# Patient Record
Sex: FEMALE | Race: WHITE | NOT HISPANIC OR LATINO
[De-identification: names, ages, dates, MRNs, and addresses within clinical notes are randomized per-mention and may not be internally consistent; named-entity substitution may affect disease eponyms.]

---

## 2017-01-20 ENCOUNTER — APPOINTMENT (OUTPATIENT)
Dept: INTERNAL MEDICINE | Facility: CLINIC | Age: 72
End: 2017-01-20

## 2017-01-20 VITALS
WEIGHT: 117 LBS | DIASTOLIC BLOOD PRESSURE: 54 MMHG | SYSTOLIC BLOOD PRESSURE: 90 MMHG | OXYGEN SATURATION: 99 % | TEMPERATURE: 98.4 F | BODY MASS INDEX: 19.97 KG/M2 | HEIGHT: 64 IN | HEART RATE: 68 BPM

## 2017-01-21 VITALS — DIASTOLIC BLOOD PRESSURE: 55 MMHG | SYSTOLIC BLOOD PRESSURE: 110 MMHG

## 2017-03-12 ENCOUNTER — RX RENEWAL (OUTPATIENT)
Age: 72
End: 2017-03-12

## 2017-03-21 ENCOUNTER — APPOINTMENT (OUTPATIENT)
Dept: INTERNAL MEDICINE | Facility: CLINIC | Age: 72
End: 2017-03-21

## 2017-03-21 VITALS
BODY MASS INDEX: 20.14 KG/M2 | HEIGHT: 64 IN | SYSTOLIC BLOOD PRESSURE: 90 MMHG | HEART RATE: 67 BPM | OXYGEN SATURATION: 99 % | WEIGHT: 118 LBS | DIASTOLIC BLOOD PRESSURE: 58 MMHG | TEMPERATURE: 98.1 F

## 2017-03-21 LAB
GLUCOSE UR-MCNC: NORMAL
HGB UR QL STRIP.AUTO: NORMAL
KETONES UR-MCNC: NORMAL
LEUKOCYTE ESTERASE UR QL STRIP: NORMAL
NITRITE UR QL STRIP: NORMAL
PH UR STRIP: 5.5
PROT UR STRIP-MCNC: NORMAL
SP GR UR STRIP: 1.02

## 2017-03-30 LAB
ALBUMIN SERPL ELPH-MCNC: 4.1 G/DL
ALP BLD-CCNC: 73 U/L
ALT SERPL-CCNC: 9 U/L
AMYLASE/CREAT SERPL: 58 U/L
ANION GAP SERPL CALC-SCNC: 12 MMOL/L
AST SERPL-CCNC: 19 U/L
BACTERIA UR CULT: NORMAL
BILIRUB SERPL-MCNC: 0.3 MG/DL
BUN SERPL-MCNC: 17 MG/DL
CALCIUM SERPL-MCNC: 9.5 MG/DL
CHLORIDE SERPL-SCNC: 101 MMOL/L
CO2 SERPL-SCNC: 26 MMOL/L
CREAT SERPL-MCNC: 0.75 MG/DL
GLUCOSE SERPL-MCNC: 81 MG/DL
LPL SERPL-CCNC: 32 U/L
POTASSIUM SERPL-SCNC: 4.1 MMOL/L
PROT SERPL-MCNC: 7 G/DL
SODIUM SERPL-SCNC: 139 MMOL/L
T4 FREE SERPL-MCNC: 1.1 NG/DL
TSH SERPL-ACNC: 0.97 UIU/ML

## 2017-03-31 VITALS — SYSTOLIC BLOOD PRESSURE: 100 MMHG | DIASTOLIC BLOOD PRESSURE: 60 MMHG

## 2017-05-19 ENCOUNTER — APPOINTMENT (OUTPATIENT)
Dept: INTERNAL MEDICINE | Facility: CLINIC | Age: 72
End: 2017-05-19

## 2017-05-19 VITALS
BODY MASS INDEX: 20.14 KG/M2 | HEART RATE: 64 BPM | HEIGHT: 64 IN | WEIGHT: 118 LBS | OXYGEN SATURATION: 98 % | TEMPERATURE: 98.3 F

## 2017-05-19 VITALS — SYSTOLIC BLOOD PRESSURE: 100 MMHG | DIASTOLIC BLOOD PRESSURE: 65 MMHG

## 2017-05-28 LAB
BASOPHILS # BLD AUTO: 0.01 K/UL
BASOPHILS NFR BLD AUTO: 0.2 %
EOSINOPHIL # BLD AUTO: 0.1 K/UL
EOSINOPHIL NFR BLD AUTO: 1.7 %
ERYTHROCYTE [SEDIMENTATION RATE] IN BLOOD BY WESTERGREN METHOD: 5 MM/HR
HCT VFR BLD CALC: 33 %
HGB BLD-MCNC: 10.4 G/DL
IMM GRANULOCYTES NFR BLD AUTO: 0.2 %
LYMPHOCYTES # BLD AUTO: 1.49 K/UL
LYMPHOCYTES NFR BLD AUTO: 25.1 %
MAN DIFF?: NORMAL
MCHC RBC-ENTMCNC: 27.2 PG
MCHC RBC-ENTMCNC: 31.5 GM/DL
MCV RBC AUTO: 86.4 FL
MONOCYTES # BLD AUTO: 0.43 K/UL
MONOCYTES NFR BLD AUTO: 7.3 %
NEUTROPHILS # BLD AUTO: 3.89 K/UL
NEUTROPHILS NFR BLD AUTO: 65.5 %
PLATELET # BLD AUTO: 236 K/UL
RBC # BLD: 3.82 M/UL
RBC # FLD: 14.3 %
WBC # FLD AUTO: 5.93 K/UL

## 2017-06-19 ENCOUNTER — APPOINTMENT (OUTPATIENT)
Dept: INTERNAL MEDICINE | Facility: CLINIC | Age: 72
End: 2017-06-19

## 2017-06-19 VITALS
WEIGHT: 119 LBS | HEIGHT: 64 IN | HEART RATE: 65 BPM | DIASTOLIC BLOOD PRESSURE: 60 MMHG | BODY MASS INDEX: 20.32 KG/M2 | TEMPERATURE: 98.1 F | OXYGEN SATURATION: 98 % | SYSTOLIC BLOOD PRESSURE: 90 MMHG

## 2017-06-19 VITALS — SYSTOLIC BLOOD PRESSURE: 110 MMHG | DIASTOLIC BLOOD PRESSURE: 70 MMHG

## 2017-06-29 DIAGNOSIS — N60.19 DIFFUSE CYSTIC MASTOPATHY OF UNSPECIFIED BREAST: ICD-10-CM

## 2017-08-07 ENCOUNTER — APPOINTMENT (OUTPATIENT)
Dept: INTERNAL MEDICINE | Facility: CLINIC | Age: 72
End: 2017-08-07
Payer: MEDICARE

## 2017-08-07 VITALS
OXYGEN SATURATION: 99 % | TEMPERATURE: 97.5 F | HEART RATE: 70 BPM | BODY MASS INDEX: 20.82 KG/M2 | WEIGHT: 119 LBS | DIASTOLIC BLOOD PRESSURE: 62 MMHG | HEIGHT: 63.5 IN | SYSTOLIC BLOOD PRESSURE: 98 MMHG | RESPIRATION RATE: 16 BRPM

## 2017-08-07 PROCEDURE — 36415 COLL VENOUS BLD VENIPUNCTURE: CPT

## 2017-08-07 PROCEDURE — 99214 OFFICE O/P EST MOD 30 MIN: CPT | Mod: 25

## 2017-08-07 RX ORDER — ASPIRIN 81 MG
81 TABLET, DELAYED RELEASE (ENTERIC COATED) ORAL
Refills: 0 | Status: ACTIVE | COMMUNITY
Start: 2017-08-07

## 2017-08-07 RX ORDER — AMOXICILLIN AND CLAVULANATE POTASSIUM 875; 125 MG/1; MG/1
875-125 TABLET, COATED ORAL
Qty: 20 | Refills: 0 | Status: DISCONTINUED | COMMUNITY
Start: 2017-05-19 | End: 2017-08-07

## 2017-08-09 VITALS — SYSTOLIC BLOOD PRESSURE: 100 MMHG | DIASTOLIC BLOOD PRESSURE: 65 MMHG

## 2017-08-09 LAB
ALBUMIN SERPL ELPH-MCNC: 4.1 G/DL
ALP BLD-CCNC: 79 U/L
ALT SERPL-CCNC: 10 U/L
ANION GAP SERPL CALC-SCNC: 12 MMOL/L
AST SERPL-CCNC: 17 U/L
BASOPHILS # BLD AUTO: 0.02 K/UL
BASOPHILS NFR BLD AUTO: 0.5 %
BILIRUB SERPL-MCNC: 0.3 MG/DL
BUN SERPL-MCNC: 19 MG/DL
CALCIUM SERPL-MCNC: 9.3 MG/DL
CHLORIDE SERPL-SCNC: 102 MMOL/L
CHOLEST SERPL-MCNC: 177 MG/DL
CHOLEST/HDLC SERPL: 2.4 RATIO
CO2 SERPL-SCNC: 26 MMOL/L
CREAT SERPL-MCNC: 0.74 MG/DL
EOSINOPHIL # BLD AUTO: 0.14 K/UL
EOSINOPHIL NFR BLD AUTO: 3.2 %
FERRITIN SERPL-MCNC: 103 NG/ML
GLUCOSE SERPL-MCNC: 77 MG/DL
HBA1C MFR BLD HPLC: 5.3 %
HCT VFR BLD CALC: 34.8 %
HDLC SERPL-MCNC: 74 MG/DL
HGB BLD-MCNC: 10.5 G/DL
IMM GRANULOCYTES NFR BLD AUTO: 0.2 %
IRON SATN MFR SERPL: 30 %
IRON SERPL-MCNC: 78 UG/DL
LDLC SERPL CALC-MCNC: 89 MG/DL
LYMPHOCYTES # BLD AUTO: 0.96 K/UL
LYMPHOCYTES NFR BLD AUTO: 22 %
MAN DIFF?: NORMAL
MCHC RBC-ENTMCNC: 26.9 PG
MCHC RBC-ENTMCNC: 30.2 GM/DL
MCV RBC AUTO: 89.2 FL
MONOCYTES # BLD AUTO: 0.34 K/UL
MONOCYTES NFR BLD AUTO: 7.8 %
NEUTROPHILS # BLD AUTO: 2.9 K/UL
NEUTROPHILS NFR BLD AUTO: 66.3 %
PLATELET # BLD AUTO: 208 K/UL
POTASSIUM SERPL-SCNC: 3.8 MMOL/L
PROT SERPL-MCNC: 7.2 G/DL
RBC # BLD: 3.9 M/UL
RBC # FLD: 14.2 %
SODIUM SERPL-SCNC: 140 MMOL/L
T4 FREE SERPL-MCNC: 1.1 NG/DL
TIBC SERPL-MCNC: 260 UG/DL
TRIGL SERPL-MCNC: 71 MG/DL
TSH SERPL-ACNC: 1.66 UIU/ML
UIBC SERPL-MCNC: 182 UG/DL
WBC # FLD AUTO: 4.37 K/UL

## 2017-09-05 ENCOUNTER — APPOINTMENT (OUTPATIENT)
Dept: INTERNAL MEDICINE | Facility: CLINIC | Age: 72
End: 2017-09-05
Payer: MEDICARE

## 2017-09-05 VITALS
WEIGHT: 119 LBS | BODY MASS INDEX: 20.82 KG/M2 | HEIGHT: 63.5 IN | OXYGEN SATURATION: 97 % | TEMPERATURE: 98.2 F | DIASTOLIC BLOOD PRESSURE: 60 MMHG | SYSTOLIC BLOOD PRESSURE: 100 MMHG | HEART RATE: 63 BPM

## 2017-09-05 PROCEDURE — 99214 OFFICE O/P EST MOD 30 MIN: CPT

## 2017-09-07 ENCOUNTER — MEDICATION RENEWAL (OUTPATIENT)
Age: 72
End: 2017-09-07

## 2017-09-08 VITALS — DIASTOLIC BLOOD PRESSURE: 55 MMHG | SYSTOLIC BLOOD PRESSURE: 110 MMHG

## 2017-10-06 ENCOUNTER — APPOINTMENT (OUTPATIENT)
Dept: INTERNAL MEDICINE | Facility: CLINIC | Age: 72
End: 2017-10-06
Payer: MEDICARE

## 2017-10-06 VITALS
BODY MASS INDEX: 20.32 KG/M2 | TEMPERATURE: 98.1 F | HEIGHT: 64 IN | HEART RATE: 68 BPM | WEIGHT: 119 LBS | SYSTOLIC BLOOD PRESSURE: 100 MMHG | OXYGEN SATURATION: 98 % | DIASTOLIC BLOOD PRESSURE: 60 MMHG

## 2017-10-06 VITALS — SYSTOLIC BLOOD PRESSURE: 95 MMHG | DIASTOLIC BLOOD PRESSURE: 60 MMHG | TEMPERATURE: 99.1 F

## 2017-10-06 PROCEDURE — 99213 OFFICE O/P EST LOW 20 MIN: CPT

## 2017-10-21 ENCOUNTER — EMERGENCY (EMERGENCY)
Facility: HOSPITAL | Age: 72
LOS: 1 days | Discharge: HOME | End: 2017-10-21
Attending: EMERGENCY MEDICINE | Admitting: EMERGENCY MEDICINE
Payer: COMMERCIAL

## 2017-10-21 VITALS
DIASTOLIC BLOOD PRESSURE: 75 MMHG | RESPIRATION RATE: 18 BRPM | OXYGEN SATURATION: 100 % | SYSTOLIC BLOOD PRESSURE: 118 MMHG | HEART RATE: 66 BPM | TEMPERATURE: 98 F

## 2017-10-21 PROCEDURE — 99284 EMERGENCY DEPT VISIT MOD MDM: CPT | Mod: GC

## 2017-10-21 RX ORDER — VANCOMYCIN HCL 1 G
1 VIAL (EA) INTRAVENOUS
Qty: 0 | Refills: 0 | Status: DISCONTINUED | OUTPATIENT
Start: 2017-10-21 | End: 2017-10-25

## 2017-10-21 RX ORDER — TOBRAMYCIN 0.3 %
1 DROPS OPHTHALMIC (EYE)
Qty: 0 | Refills: 0 | Status: DISCONTINUED | OUTPATIENT
Start: 2017-10-21 | End: 2017-10-25

## 2017-10-21 NOTE — ED ADULT TRIAGE NOTE - CHIEF COMPLAINT QUOTE
Pt was at optometrist because for "sticking pain" in right eye. Told that she has an area of pus in her eye and will need to be seen by an opthomologist and start antibiotic eye drops. Pt was at optometrist because for "sticking pain" in right eye. Told that she has an area of pus in her eye and will need to be seen by an opthomologist and start antibiotic eye drops. Denies visual disturbances. Pt was at optometrist for "sticking pain" in right eye. Told that she has an area of pus in her eye and will need to be seen by an opthomologist and start antibiotic eye drops. Denies visual disturbances.

## 2017-10-21 NOTE — CONSULT NOTE ADULT - SUBJECTIVE AND OBJECTIVE BOX
~~ CONSULT PENDING [facility] Ophthalmology Consult Note    CC: Right eye pain      HPI: 72 y.o F denies PMH presenting with R eye irritation. Patient was seen by an optometrist and told to go to the ED. Patient reports one day of discomfort a/w redness. Denies visual changes.         PMH: Denies   POcHx:  Denies surgeries lasers or trauma. Patient uses weekly contacts. Denies sleeping with them.   FamHx: Denies   Social Hx: None   Medications: No use of ggt   Allergies: Cipro, Sulfa     ROS:  General (neg), Vision (per HPI), Head and Neck (neg), Pulm (neg), CV (neg), GI (neg),  (neg), Musculoskeletal (neg), Skin/Integ (neg), Neuro (neg), Endocrine (neg), Heme (neg), All/Immuno (neg)    Mood and Affect Appropriate ( x ),  Oriented to Time, Place, and Person x 3 ( x )    Ophthalmology Exam    Visual acuity (sc or cc): 20/40   OD, 20/40      OS, PH:  20/25  OD,   20/30     OS  Pupils: R&R OU no APD  Extraocular movements (EOMs): Full OU  Confrontational Visual Field (CVF):  Full OU  Ttono:   17  OD, 17    OS      Slit Lamp Exam (SLE)  External:  WNL OU  Lids/Lashes/Lacrimal Ducts:  WNL OU  Sclera/Conjunctiva:  1+inj OD, W&Q OS  Cornea: 2oclock stromal infiltrate with overlying epi defect (ulcer) irregular edges. ~2mm in diameter  Anterior Chamber: W&Q OU  Iris: Flat OU   Lens: NS OU      Fundus Exam  (dilated with 1% tropicamide and 2.5% phenylephrine @   6PM           )  Approval obtained from primary team for dilation  Patient aware that pupils can remained dilated for at least 4-6 hours  Exam performed with 20D lens    DFE PENDING     C/D.   M/V/P:       A/P. Cornea Ulcer OD in the setting of contact lens use.   - Cornea cultures sent x 4  - Start Fortified abx ggt; Tobramycin and Vancomycin  - Patient will be seen in [facility]. Cell: [phone]    Follow-Up:  Patient should follow up in the [facility] Ophthalmology Practice after discharge.    [address] [place] [address]  [phone] (practice) or [phone] (clinic) [facility] Ophthalmology Consult Note    CC: Right eye pain      HPI: 72 y.o F denies PMH presenting with R eye irritation. Patient was seen by an optometrist and told to go to the ED. Patient reports one day of discomfort a/w redness. Denies visual changes.         PMH: Denies   POcHx:  Denies surgeries lasers or trauma. Patient uses weekly contacts. Denies sleeping with them.   FamHx: Denies   Social Hx: None   Medications: No use of ggt   Allergies: Cipro, Sulfa     ROS:  General (neg), Vision (per HPI), Head and Neck (neg), Pulm (neg), CV (neg), GI (neg),  (neg), Musculoskeletal (neg), Skin/Integ (neg), Neuro (neg), Endocrine (neg), Heme (neg), All/Immuno (neg)    Mood and Affect Appropriate ( x ),  Oriented to Time, Place, and Person x 3 ( x )    Ophthalmology Exam    Visual acuity (sc or cc): 20/40   OD, 20/40      OS, PH:  20/25  OD,   20/30     OS  Pupils: R&R OU no APD  Extraocular movements (EOMs): Full OU  Confrontational Visual Field (CVF):  Full OU  Ttono:   17  OD, 17    OS      Slit Lamp Exam (SLE)  External:  WNL OU  Lids/Lashes/Lacrimal Ducts:  WNL OU  Sclera/Conjunctiva:  1+inj OD, W&Q OS  Cornea: 2oclock stromal infiltrate with overlying epi defect (ulcer) irregular edges. ~2mm in diameter  Anterior Chamber: W&Q OU  Iris: Flat OU   Lens: NS OU      Fundus Exam  (dilated with 1% tropicamide and 2.5% phenylephrine @   6PM           )  Approval obtained from primary team for dilation  Patient aware that pupils can remained dilated for at least 4-6 hours  Exam performed with 20D lens    C/D. 0.3/0.3  M/V/P: WNL      A/P. Cornea Ulcer OD in the setting of contact lens use.   - Cornea cultures sent x 4  - Start Fortified abx ggt; Tobramycin and Vancomycin  - Patient will be seen in [facility]. Cell: [phone]    Follow-Up:  Patient should follow up in the [facility] Ophthalmology Practice after discharge.    [address] [place] [address].  [place] [address]  [phone] (practice) or [phone] (clinic) [facility] Ophthalmology Consult Note    CC: Right eye pain      HPI: 72 y.o F denies PMH presenting with R eye irritation. Patient was seen by an optometrist and told to go to the ED. Patient reports one day of discomfort a/w redness. Denies visual changes.         PMH: Denies   POcHx:  Denies surgeries lasers or trauma. Patient uses weekly contacts. Denies sleeping with them.   FamHx: Denies   Social Hx: None   Medications: No use of ggt   Allergies: Cipro, Sulfa     ROS:  General (neg), Vision (per HPI), Head and Neck (neg), Pulm (neg), CV (neg), GI (neg),  (neg), Musculoskeletal (neg), Skin/Integ (neg), Neuro (neg), Endocrine (neg), Heme (neg), All/Immuno (neg)    Mood and Affect Appropriate ( x ),  Oriented to Time, Place, and Person x 3 ( x )    Ophthalmology Exam    Visual acuity (sc or cc): 20/40   OD, 20/40      OS, PH:  20/25  OD,   20/30     OS  Pupils: R&R OU no APD  Extraocular movements (EOMs): Full OU  Confrontational Visual Field (CVF):  Full OU  Ttono:   17  OD, 17    OS      Slit Lamp Exam (SLE)  External:  WNL OU  Lids/Lashes/Lacrimal Ducts:  WNL OU  Sclera/Conjunctiva:  1+inj OD, W&Q OS  Cornea: 2oclock stromal infiltrate with overlying epi defect (ulcer) irregular edges. ~2mm in diameter  Anterior Chamber: W&Q OU  Iris: Flat OU   Lens: NS OU      Fundus Exam  (dilated with 1% tropicamide and 2.5% phenylephrine @   6PM    )  Approval obtained from primary team for dilation  Patient aware that pupils can remained dilated for at least 4-6 hours  Exam performed with 20D lens    C/D. 0.3/0.3  M/V/P: WNL      A/P. Cornea Ulcer OD in the setting of contact lens use.   - Cornea cultures sent x 4 plated  - Start Fortified abx ggt; Tobramycin and Vancomycin every hour alternating  - Patient will be seen in [facility]. Cell: [phone]    Follow-Up:  Patient should follow up in the [facility] Ophthalmology Practice after discharge.    [address] [place] [address].  [place] [address]  [phone] (practice) or [phone] (clinic)    d/w chief resident [facility] Ophthalmology Consult Note    CC: Right eye pain      HPI: 72 y.o F denies PMH presenting with R eye irritation. Patient was seen by an optometrist and told to go to the ED. Patient reports one day of discomfort a/w redness. Denies visual changes.         PMH: Denies   POcHx:  Denies surgeries lasers or trauma. Patient uses weekly contacts. Denies sleeping with them.   FamHx: Denies   Social Hx: None   Medications: No use of ggt   Allergies: Cipro, Sulfa     ROS:  General (neg), Vision (per HPI), Head and Neck (neg), Pulm (neg), CV (neg), GI (neg),  (neg), Musculoskeletal (neg), Skin/Integ (neg), Neuro (neg), Endocrine (neg), Heme (neg), All/Immuno (neg)    Mood and Affect Appropriate ( x ),  Oriented to Time, Place, and Person x 3 ( x )    Ophthalmology Exam    Visual acuity (sc or cc): 20/40   OD, 20/40      OS, PH:  20/25  OD,   20/30     OS  Pupils: R&R OU no APD  Extraocular movements (EOMs): Full OU  Confrontational Visual Field (CVF):  Full OU  Ttono:   17  OD, 17    OS      Slit Lamp Exam (SLE)  External:  WNL OU  Lids/Lashes/Lacrimal Ducts:  WNL OU  Sclera/Conjunctiva:  1+inj OD, W&Q OS  Cornea: 2oclock stromal infiltrate with overlying epi defect (ulcer) irregular edges. ~2mm in diameter  Anterior Chamber: W&Q OU  Iris: Flat OU   Lens: NS OU      Fundus Exam  (dilated with 1% tropicamide and 2.5% phenylephrine @   6PM    )  Approval obtained from primary team for dilation  Patient aware that pupils can remained dilated for at least 4-6 hours  Exam performed with 20D lens    C/D. 0.3/0.3  M/V/P: WNL      A/P. Cornea Ulcer OD in the setting of contact lens use.   - Cornea cultures sent; Blood agar, Sabouraud, Thioglycolate, Chocolate agar. Patient does not have her contact lens for culture use  - Start Fortified abx ggt; Tobramycin and Vancomycin every hour alternating  - Patient will be seen in [facility]. Cell: [phone]    Follow-Up:  Patient should follow up in the [facility] Ophthalmology Practice after discharge.    [address] [place] [address]  [phone] (practice) or [phone] (clinic)    d/w chief resident

## 2017-10-21 NOTE — ED PROVIDER NOTE - OBJECTIVE STATEMENT
The patient is a 72y female w no sig PMHx presenting with R eye irritation and foreign body sensation for 1 day. Her optometrist examined her and told her there was no visible foreign body, but visualized some pustular discharge so told her to see an ophthalmologist for examination and ocular antibiotics. Her R eye is mildly injected, more significantly towards the medial cornea. No blurry vision, no diplopia, no pain on ocular motion, no proptosis.

## 2017-10-21 NOTE — ED PROVIDER NOTE - MEDICAL DECISION MAKING DETAILS
71 yo F Hx Anxiety, HLD, Skin CA who was referred to the ED by her optometrist for ophthalmologic eval of "pus"/(cloudiness) at the 2 o'clock region of the R cornea. No visual changes or other complaints. Symptoms began after she attempted to remove an contact lens that was "stuck" yesterday. On exam corneal opacification noted at R upper region.  Injection noted to medial aspect of R conjunctiva. Pupils dilated by opto. EOMI. No significant misha-orbital edema, redness or lid swelling. Pt seen by opto who found a corneal ulcer. Fortified abx drops rec and cultures taken by opto. Pt stable for dc with close outpt follow up as rec by opto.

## 2017-10-21 NOTE — ED PROVIDER NOTE - ATTENDING CONTRIBUTION TO CARE
I, Mirna Ervin M.D. have examined the patient and confirmed the essential components of the history, physical examination, diagnosis, and treatment plan. I agree with the patient's care as documented by the resident and amended herein by me. See note above for complete details of service.  73 yo F Hx Anxiety, HLD, Skin CA who was referred to the ED by her optometrist for ophthalmologic eval of "pus"/(cloudiness) at the 2 o'clock region of the R cornea. No visual changes or other complaints. Symptoms began after she attempted to remove an contact lens that was "stuck" yesterday. On exam corneal opacification noted at R upper region.  Injection noted to medial aspect of R conjunctiva. Pupils dilated by opto. EOMI. No significant misha-orbital edema, redness or lid swelling. Pt seen by opto who found a corneal ulcer. Fortified abx drops rec and cultures taken by opto. Pt stable for dc with close outpt follow up as rec by opto. Pt stable for discharge. PMD follow up within 24 - 48 hours. DC instructions and warning signs for return given.

## 2017-10-23 ENCOUNTER — APPOINTMENT (OUTPATIENT)
Dept: OPHTHALMOLOGY | Facility: CLINIC | Age: 72
End: 2017-10-23
Payer: MEDICARE

## 2017-10-23 LAB — SPECIMEN SOURCE: SIGNIFICANT CHANGE UP

## 2017-10-23 PROCEDURE — 92002 INTRM OPH EXAM NEW PATIENT: CPT

## 2017-10-24 LAB — SPECIMEN SOURCE: SIGNIFICANT CHANGE UP

## 2017-10-25 ENCOUNTER — APPOINTMENT (OUTPATIENT)
Dept: OPHTHALMOLOGY | Facility: CLINIC | Age: 72
End: 2017-10-25
Payer: MEDICARE

## 2017-10-25 PROCEDURE — 92012 INTRM OPH EXAM EST PATIENT: CPT

## 2017-10-27 ENCOUNTER — APPOINTMENT (OUTPATIENT)
Dept: OPHTHALMOLOGY | Facility: CLINIC | Age: 72
End: 2017-10-27
Payer: MEDICARE

## 2017-10-27 LAB — BACTERIA EYE AEROBE CULT: SIGNIFICANT CHANGE UP

## 2017-10-27 PROCEDURE — 99212 OFFICE O/P EST SF 10 MIN: CPT

## 2017-10-30 ENCOUNTER — APPOINTMENT (OUTPATIENT)
Dept: INTERNAL MEDICINE | Facility: CLINIC | Age: 72
End: 2017-10-30
Payer: MEDICARE

## 2017-10-30 VITALS
HEART RATE: 72 BPM | OXYGEN SATURATION: 99 % | BODY MASS INDEX: 20.65 KG/M2 | HEIGHT: 63.5 IN | WEIGHT: 118 LBS | TEMPERATURE: 97.7 F | DIASTOLIC BLOOD PRESSURE: 70 MMHG | SYSTOLIC BLOOD PRESSURE: 114 MMHG

## 2017-10-30 DIAGNOSIS — Z87.09 PERSONAL HISTORY OF OTHER DISEASES OF THE RESPIRATORY SYSTEM: ICD-10-CM

## 2017-10-30 DIAGNOSIS — J06.9 ACUTE UPPER RESPIRATORY INFECTION, UNSPECIFIED: ICD-10-CM

## 2017-10-30 DIAGNOSIS — E53.8 DEFICIENCY OF OTHER SPECIFIED B GROUP VITAMINS: ICD-10-CM

## 2017-10-30 PROCEDURE — 36415 COLL VENOUS BLD VENIPUNCTURE: CPT

## 2017-10-30 PROCEDURE — 90662 IIV NO PRSV INCREASED AG IM: CPT

## 2017-10-30 PROCEDURE — G0439: CPT

## 2017-10-30 PROCEDURE — G0008: CPT

## 2017-10-30 RX ORDER — AMOXICILLIN AND CLAVULANATE POTASSIUM 875; 125 MG/1; MG/1
875-125 TABLET, COATED ORAL
Qty: 20 | Refills: 0 | Status: DISCONTINUED | COMMUNITY
Start: 2017-10-06 | End: 2017-10-30

## 2017-10-31 ENCOUNTER — APPOINTMENT (OUTPATIENT)
Dept: OPHTHALMOLOGY | Facility: CLINIC | Age: 72
End: 2017-10-31
Payer: MEDICARE

## 2017-10-31 PROCEDURE — 99212 OFFICE O/P EST SF 10 MIN: CPT

## 2017-11-05 LAB
25(OH)D3 SERPL-MCNC: 51.4 NG/ML
ALBUMIN SERPL ELPH-MCNC: 4.2 G/DL
ALP BLD-CCNC: 85 U/L
ALT SERPL-CCNC: 10 U/L
ANION GAP SERPL CALC-SCNC: 14 MMOL/L
APPEARANCE: CLEAR
AST SERPL-CCNC: 17 U/L
BACTERIA: NEGATIVE
BASOPHILS # BLD AUTO: 0.02 K/UL
BASOPHILS NFR BLD AUTO: 0.4 %
BILIRUB SERPL-MCNC: 0.3 MG/DL
BILIRUBIN URINE: NEGATIVE
BLOOD URINE: NEGATIVE
BUN SERPL-MCNC: 19 MG/DL
CALCIUM SERPL-MCNC: 9.6 MG/DL
CHLORIDE SERPL-SCNC: 99 MMOL/L
CHOLEST SERPL-MCNC: 164 MG/DL
CHOLEST/HDLC SERPL: 2.1 RATIO
CO2 SERPL-SCNC: 28 MMOL/L
COLOR: YELLOW
CREAT SERPL-MCNC: 0.73 MG/DL
EOSINOPHIL # BLD AUTO: 0.16 K/UL
EOSINOPHIL NFR BLD AUTO: 3.1 %
GLUCOSE QUALITATIVE U: NEGATIVE MG/DL
GLUCOSE SERPL-MCNC: 92 MG/DL
HCT VFR BLD CALC: 35.1 %
HDLC SERPL-MCNC: 77 MG/DL
HGB BLD-MCNC: 10.9 G/DL
HYALINE CASTS: 2 /LPF
IMM GRANULOCYTES NFR BLD AUTO: 0 %
KETONES URINE: NEGATIVE
LDLC SERPL CALC-MCNC: 71 MG/DL
LEUKOCYTE ESTERASE URINE: NEGATIVE
LYMPHOCYTES # BLD AUTO: 1.39 K/UL
LYMPHOCYTES NFR BLD AUTO: 27.1 %
MAN DIFF?: NORMAL
MCHC RBC-ENTMCNC: 27.4 PG
MCHC RBC-ENTMCNC: 31.1 GM/DL
MCV RBC AUTO: 88.2 FL
MICROSCOPIC-UA: NORMAL
MONOCYTES # BLD AUTO: 0.43 K/UL
MONOCYTES NFR BLD AUTO: 8.4 %
NEUTROPHILS # BLD AUTO: 3.12 K/UL
NEUTROPHILS NFR BLD AUTO: 61 %
NITRITE URINE: NEGATIVE
PH URINE: 5
PLATELET # BLD AUTO: 233 K/UL
POTASSIUM SERPL-SCNC: 4.2 MMOL/L
PROT SERPL-MCNC: 7.2 G/DL
PROTEIN URINE: NEGATIVE MG/DL
RBC # BLD: 3.98 M/UL
RBC # FLD: 14.1 %
RED BLOOD CELLS URINE: 8 /HPF
SODIUM SERPL-SCNC: 141 MMOL/L
SPECIFIC GRAVITY URINE: 1.03
SQUAMOUS EPITHELIAL CELLS: 2 /HPF
TRIGL SERPL-MCNC: 80 MG/DL
UROBILINOGEN URINE: NEGATIVE MG/DL
WBC # FLD AUTO: 5.12 K/UL
WHITE BLOOD CELLS URINE: 2 /HPF

## 2017-11-10 ENCOUNTER — APPOINTMENT (OUTPATIENT)
Dept: OPHTHALMOLOGY | Facility: CLINIC | Age: 72
End: 2017-11-10
Payer: MEDICARE

## 2017-11-10 PROCEDURE — 92012 INTRM OPH EXAM EST PATIENT: CPT

## 2017-11-21 ENCOUNTER — APPOINTMENT (OUTPATIENT)
Dept: INTERNAL MEDICINE | Facility: CLINIC | Age: 72
End: 2017-11-21
Payer: MEDICARE

## 2017-11-21 VITALS
SYSTOLIC BLOOD PRESSURE: 110 MMHG | WEIGHT: 118 LBS | TEMPERATURE: 98.3 F | HEIGHT: 63.5 IN | BODY MASS INDEX: 20.65 KG/M2 | OXYGEN SATURATION: 99 % | HEART RATE: 65 BPM | DIASTOLIC BLOOD PRESSURE: 60 MMHG

## 2017-11-21 LAB — FUNGUS SPEC QL CULT: SIGNIFICANT CHANGE UP

## 2017-11-21 PROCEDURE — 99213 OFFICE O/P EST LOW 20 MIN: CPT

## 2017-12-01 VITALS — SYSTOLIC BLOOD PRESSURE: 118 MMHG | HEART RATE: 62 BPM | DIASTOLIC BLOOD PRESSURE: 72 MMHG

## 2017-12-08 ENCOUNTER — APPOINTMENT (OUTPATIENT)
Dept: OPHTHALMOLOGY | Facility: CLINIC | Age: 72
End: 2017-12-08
Payer: MEDICARE

## 2017-12-08 DIAGNOSIS — H26.9 UNSPECIFIED CATARACT: ICD-10-CM

## 2017-12-08 DIAGNOSIS — H17.9 UNSPECIFIED CORNEAL SCAR AND OPACITY: ICD-10-CM

## 2017-12-08 DIAGNOSIS — H16.001 UNSPECIFIED CORNEAL ULCER, RIGHT EYE: ICD-10-CM

## 2017-12-08 PROCEDURE — 92012 INTRM OPH EXAM EST PATIENT: CPT

## 2017-12-18 ENCOUNTER — APPOINTMENT (OUTPATIENT)
Dept: INTERNAL MEDICINE | Facility: CLINIC | Age: 72
End: 2017-12-18
Payer: MEDICARE

## 2017-12-18 VITALS
DIASTOLIC BLOOD PRESSURE: 60 MMHG | WEIGHT: 118 LBS | HEIGHT: 63.5 IN | TEMPERATURE: 97.9 F | BODY MASS INDEX: 20.65 KG/M2 | SYSTOLIC BLOOD PRESSURE: 98 MMHG | OXYGEN SATURATION: 100 % | HEART RATE: 70 BPM | RESPIRATION RATE: 14 BRPM

## 2017-12-18 VITALS — SYSTOLIC BLOOD PRESSURE: 105 MMHG | DIASTOLIC BLOOD PRESSURE: 65 MMHG

## 2017-12-18 PROCEDURE — 99213 OFFICE O/P EST LOW 20 MIN: CPT

## 2018-01-22 ENCOUNTER — APPOINTMENT (OUTPATIENT)
Dept: INTERNAL MEDICINE | Facility: CLINIC | Age: 73
End: 2018-01-22
Payer: MEDICARE

## 2018-01-22 VITALS
TEMPERATURE: 98.3 F | BODY MASS INDEX: 20.74 KG/M2 | HEART RATE: 68 BPM | DIASTOLIC BLOOD PRESSURE: 60 MMHG | SYSTOLIC BLOOD PRESSURE: 120 MMHG | WEIGHT: 118.5 LBS | HEIGHT: 63.5 IN | OXYGEN SATURATION: 98 %

## 2018-01-22 VITALS — SYSTOLIC BLOOD PRESSURE: 105 MMHG | DIASTOLIC BLOOD PRESSURE: 65 MMHG

## 2018-01-22 PROCEDURE — 99213 OFFICE O/P EST LOW 20 MIN: CPT | Mod: 25

## 2018-01-22 PROCEDURE — 96372 THER/PROPH/DIAG INJ SC/IM: CPT

## 2018-01-22 RX ORDER — CYANOCOBALAMIN 1000 UG/ML
1000 INJECTION INTRAMUSCULAR; SUBCUTANEOUS
Qty: 0 | Refills: 0 | Status: COMPLETED | OUTPATIENT
Start: 2018-01-22

## 2018-01-22 RX ADMIN — CYANOCOBALAMIN 0 MCG/ML: 1000 INJECTION, SOLUTION INTRAMUSCULAR at 00:00

## 2018-04-16 ENCOUNTER — APPOINTMENT (OUTPATIENT)
Dept: INTERNAL MEDICINE | Facility: CLINIC | Age: 73
End: 2018-04-16
Payer: MEDICARE

## 2018-04-16 VITALS
WEIGHT: 120 LBS | TEMPERATURE: 98.2 F | HEIGHT: 63.5 IN | DIASTOLIC BLOOD PRESSURE: 60 MMHG | HEART RATE: 70 BPM | SYSTOLIC BLOOD PRESSURE: 100 MMHG | OXYGEN SATURATION: 99 % | BODY MASS INDEX: 21 KG/M2

## 2018-04-16 VITALS — DIASTOLIC BLOOD PRESSURE: 65 MMHG | SYSTOLIC BLOOD PRESSURE: 110 MMHG

## 2018-04-16 PROCEDURE — 99213 OFFICE O/P EST LOW 20 MIN: CPT

## 2018-05-11 ENCOUNTER — APPOINTMENT (OUTPATIENT)
Dept: INTERNAL MEDICINE | Facility: CLINIC | Age: 73
End: 2018-05-11
Payer: MEDICARE

## 2018-05-11 PROCEDURE — 36415 COLL VENOUS BLD VENIPUNCTURE: CPT

## 2018-05-15 LAB
ALBUMIN SERPL ELPH-MCNC: 4.3 G/DL
ALP BLD-CCNC: 83 U/L
ALT SERPL-CCNC: 13 U/L
ANION GAP SERPL CALC-SCNC: 14 MMOL/L
AST SERPL-CCNC: 20 U/L
BASOPHILS # BLD AUTO: 0.02 K/UL
BASOPHILS NFR BLD AUTO: 0.5 %
BILIRUB SERPL-MCNC: 0.4 MG/DL
BUN SERPL-MCNC: 18 MG/DL
CALCIUM SERPL-MCNC: 9.6 MG/DL
CHLORIDE SERPL-SCNC: 103 MMOL/L
CHOLEST SERPL-MCNC: 176 MG/DL
CHOLEST/HDLC SERPL: 1.9 RATIO
CO2 SERPL-SCNC: 26 MMOL/L
CREAT SERPL-MCNC: 0.83 MG/DL
EOSINOPHIL # BLD AUTO: 0.06 K/UL
EOSINOPHIL NFR BLD AUTO: 1.6 %
GLUCOSE SERPL-MCNC: 84 MG/DL
HCT VFR BLD CALC: 35.7 %
HCV AB SER QL: NONREACTIVE
HCV S/CO RATIO: 0.08 S/CO
HDLC SERPL-MCNC: 92 MG/DL
HGB BLD-MCNC: 10.9 G/DL
IMM GRANULOCYTES NFR BLD AUTO: 0.3 %
LDLC SERPL CALC-MCNC: 73 MG/DL
LYMPHOCYTES # BLD AUTO: 0.99 K/UL
LYMPHOCYTES NFR BLD AUTO: 25.9 %
MAN DIFF?: NORMAL
MCHC RBC-ENTMCNC: 27.9 PG
MCHC RBC-ENTMCNC: 30.5 GM/DL
MCV RBC AUTO: 91.3 FL
MONOCYTES # BLD AUTO: 0.28 K/UL
MONOCYTES NFR BLD AUTO: 7.3 %
NEUTROPHILS # BLD AUTO: 2.46 K/UL
NEUTROPHILS NFR BLD AUTO: 64.4 %
PLATELET # BLD AUTO: 224 K/UL
POTASSIUM SERPL-SCNC: 4 MMOL/L
PROT SERPL-MCNC: 6.9 G/DL
RBC # BLD: 3.91 M/UL
RBC # FLD: 15 %
SODIUM SERPL-SCNC: 143 MMOL/L
TRIGL SERPL-MCNC: 56 MG/DL
WBC # FLD AUTO: 3.82 K/UL

## 2018-05-15 RX ORDER — AMOXICILLIN AND CLAVULANATE POTASSIUM 875; 125 MG/1; MG/1
875-125 TABLET, COATED ORAL
Qty: 20 | Refills: 0 | Status: DISCONTINUED | COMMUNITY
Start: 2017-12-18 | End: 2018-05-15

## 2018-07-06 ENCOUNTER — APPOINTMENT (OUTPATIENT)
Dept: INTERNAL MEDICINE | Facility: CLINIC | Age: 73
End: 2018-07-06
Payer: MEDICARE

## 2018-07-06 VITALS — DIASTOLIC BLOOD PRESSURE: 65 MMHG | SYSTOLIC BLOOD PRESSURE: 105 MMHG

## 2018-07-06 VITALS
DIASTOLIC BLOOD PRESSURE: 60 MMHG | HEART RATE: 64 BPM | BODY MASS INDEX: 21 KG/M2 | WEIGHT: 120 LBS | TEMPERATURE: 98.1 F | SYSTOLIC BLOOD PRESSURE: 100 MMHG | HEIGHT: 63.5 IN | OXYGEN SATURATION: 99 %

## 2018-07-06 DIAGNOSIS — R42 DIZZINESS AND GIDDINESS: ICD-10-CM

## 2018-07-06 PROCEDURE — 36415 COLL VENOUS BLD VENIPUNCTURE: CPT

## 2018-07-06 PROCEDURE — 99213 OFFICE O/P EST LOW 20 MIN: CPT | Mod: 25

## 2018-07-06 NOTE — ASSESSMENT
[FreeTextEntry1] : Likely mild sinusitis- the patient appears well.  Will treat with Augmentin which she tolerates- note she is leaving on a cruise to [facility] in two weeks.  She can use Flonase PRN, Tylenol PRN.  Will check an ESR and CRP to rule out TA since there is a temporal component, although this is unlikely.  Call PRN.

## 2018-07-06 NOTE — PHYSICAL EXAM
[No Acute Distress] : no acute distress [Well Nourished] : well nourished [Well Developed] : well developed [Normal Outer Ear/Nose] : the outer ears and nose were normal in appearance [Normal Oropharynx] : the oropharynx was normal [Normal TMs] : both tympanic membranes were normal [Normal Nasal Mucosa] : the nasal mucosa was normal [No JVD] : no jugular venous distention [Supple] : supple [No Lymphadenopathy] : no lymphadenopathy [No Respiratory Distress] : no respiratory distress  [Clear to Auscultation] : lungs were clear to auscultation bilaterally [No Accessory Muscle Use] : no accessory muscle use [Normal Rate] : normal rate  [Regular Rhythm] : with a regular rhythm [No Edema] : there was no peripheral edema

## 2018-07-06 NOTE — HISTORY OF PRESENT ILLNESS
[FreeTextEntry8] : The patient has a headache over her forehead, temples and the bridge of her nose for 5-6 days.  She has vertigo with bending down.  She has a slight nasal drip today.  She has used Flonase and Allegra which aren't helping.  No yellow/green phlegm, chest symptoms, ear pain, cough, fever.

## 2018-07-13 LAB
CRP SERPL-MCNC: <0.2 MG/DL
ERYTHROCYTE [SEDIMENTATION RATE] IN BLOOD BY WESTERGREN METHOD: 4 MM/HR

## 2018-09-24 ENCOUNTER — RX RENEWAL (OUTPATIENT)
Age: 73
End: 2018-09-24

## 2018-09-27 ENCOUNTER — APPOINTMENT (OUTPATIENT)
Dept: INTERNAL MEDICINE | Facility: CLINIC | Age: 73
End: 2018-09-27
Payer: MEDICARE

## 2018-09-27 VITALS
WEIGHT: 120 LBS | SYSTOLIC BLOOD PRESSURE: 100 MMHG | TEMPERATURE: 98.6 F | HEIGHT: 63.5 IN | DIASTOLIC BLOOD PRESSURE: 60 MMHG | OXYGEN SATURATION: 99 % | HEART RATE: 73 BPM | BODY MASS INDEX: 21 KG/M2

## 2018-09-27 VITALS — DIASTOLIC BLOOD PRESSURE: 65 MMHG | SYSTOLIC BLOOD PRESSURE: 105 MMHG

## 2018-09-27 PROCEDURE — G0008: CPT

## 2018-09-27 PROCEDURE — 36415 COLL VENOUS BLD VENIPUNCTURE: CPT

## 2018-09-27 PROCEDURE — 99214 OFFICE O/P EST MOD 30 MIN: CPT | Mod: 25

## 2018-09-27 PROCEDURE — 90662 IIV NO PRSV INCREASED AG IM: CPT

## 2018-09-27 RX ORDER — AMOXICILLIN AND CLAVULANATE POTASSIUM 875; 125 MG/1; MG/1
875-125 TABLET, COATED ORAL
Qty: 20 | Refills: 0 | Status: DISCONTINUED | COMMUNITY
Start: 2018-07-06 | End: 2018-09-27

## 2018-09-27 RX ORDER — MOXIFLOXACIN OPHTHALMIC 5 MG/ML
0.5 SOLUTION/ DROPS OPHTHALMIC
Qty: 1 | Refills: 1 | Status: DISCONTINUED | COMMUNITY
Start: 2017-10-27 | End: 2018-09-27

## 2018-09-27 NOTE — PHYSICAL EXAM
[No Acute Distress] : no acute distress [Well Nourished] : well nourished [Well Developed] : well developed [No JVD] : no jugular venous distention [Supple] : supple [No Lymphadenopathy] : no lymphadenopathy [No Respiratory Distress] : no respiratory distress  [Clear to Auscultation] : lungs were clear to auscultation bilaterally [No Accessory Muscle Use] : no accessory muscle use [Normal Rate] : normal rate  [Regular Rhythm] : with a regular rhythm [Normal S1, S2] : normal S1 and S2 [No Murmur] : no murmur heard [Pedal Pulses Present] : the pedal pulses are present [No Edema] : there was no peripheral edema [Soft] : abdomen soft [Non Tender] : non-tender [Non-distended] : non-distended [No Masses] : no abdominal mass palpated [No HSM] : no HSM [de-identified] : The angiogram site is soft, mildly tender with no sign of bleeding

## 2018-09-27 NOTE — ASSESSMENT
[FreeTextEntry1] : The patient has fatigue and tiredness.  She appears well.  We'll check blood tests to rule out anemia or other medical causes.  There might be a depression component and she can return to her psychiatrist who she hasn't recently seen for insurance reasons.  She was counseled.  medications reviewed.  \par \par Flu vaccine today.\par \par She was advised to increase the Atorvastatin to 40 mg for more aggressive lipid lowering.\par \par Check iron studies, B-12 and folate.

## 2018-09-27 NOTE — HISTORY OF PRESENT ILLNESS
[de-identified] : The patient recently had an abnormal stress test which led to a coronary angiogram and another stent placement.  She did well and was discharged.  She was seen in the [place] ER a few days ago with groin pain at the angiogram puncture site- a doppler was ok and the patient was discharged.  \par \par She comes in now with fatigue which has been going on for weeks and preceded the above events, although it is persisting now.  She feels tired, has no energy.  Sleep is fair.  Appetite is good.  No chest pain, dyspnea, fever, new URI symptoms or new GI or  symptoms.\par \par She does say she is feeling depressed.  She has been under stress because of the medical events and there is stress because of issues with her children.

## 2018-10-04 LAB
ALBUMIN SERPL ELPH-MCNC: 4.2 G/DL
ALP BLD-CCNC: 81 U/L
ALT SERPL-CCNC: 11 U/L
ANION GAP SERPL CALC-SCNC: 14 MMOL/L
AST SERPL-CCNC: 15 U/L
BASOPHILS # BLD AUTO: 0.02 K/UL
BASOPHILS NFR BLD AUTO: 0.3 %
BILIRUB SERPL-MCNC: 0.2 MG/DL
BUN SERPL-MCNC: 20 MG/DL
CALCIUM SERPL-MCNC: 9.7 MG/DL
CHLORIDE SERPL-SCNC: 101 MMOL/L
CO2 SERPL-SCNC: 25 MMOL/L
CREAT SERPL-MCNC: 0.83 MG/DL
EOSINOPHIL # BLD AUTO: 0.18 K/UL
EOSINOPHIL NFR BLD AUTO: 2.8 %
FERRITIN SERPL-MCNC: 93 NG/ML
FOLATE SERPL-MCNC: >20 NG/ML
GLUCOSE SERPL-MCNC: 95 MG/DL
HBA1C MFR BLD HPLC: 5.1 %
HCT VFR BLD CALC: 33.2 %
HGB BLD-MCNC: 9.8 G/DL
IMM GRANULOCYTES NFR BLD AUTO: 0.2 %
IRON SATN MFR SERPL: 22 %
IRON SERPL-MCNC: 60 UG/DL
LYMPHOCYTES # BLD AUTO: 1.33 K/UL
LYMPHOCYTES NFR BLD AUTO: 20.7 %
MAN DIFF?: NORMAL
MCHC RBC-ENTMCNC: 26.3 PG
MCHC RBC-ENTMCNC: 29.5 GM/DL
MCV RBC AUTO: 89.2 FL
MONOCYTES # BLD AUTO: 0.44 K/UL
MONOCYTES NFR BLD AUTO: 6.8 %
NEUTROPHILS # BLD AUTO: 4.46 K/UL
NEUTROPHILS NFR BLD AUTO: 69.2 %
PLATELET # BLD AUTO: 245 K/UL
POTASSIUM SERPL-SCNC: 3.7 MMOL/L
PROT SERPL-MCNC: 7 G/DL
RBC # BLD: 3.72 M/UL
RBC # FLD: 14.5 %
SODIUM SERPL-SCNC: 140 MMOL/L
T4 FREE SERPL-MCNC: 1 NG/DL
TIBC SERPL-MCNC: 271 UG/DL
TSH SERPL-ACNC: 0.99 UIU/ML
UIBC SERPL-MCNC: 211 UG/DL
VIT B12 SERPL-MCNC: 401 PG/ML
WBC # FLD AUTO: 6.44 K/UL

## 2018-10-22 ENCOUNTER — APPOINTMENT (OUTPATIENT)
Dept: INTERNAL MEDICINE | Facility: CLINIC | Age: 73
End: 2018-10-22
Payer: MEDICARE

## 2018-10-22 ENCOUNTER — MEDICATION RENEWAL (OUTPATIENT)
Age: 73
End: 2018-10-22

## 2018-10-22 VITALS
OXYGEN SATURATION: 98 % | BODY MASS INDEX: 21.52 KG/M2 | HEART RATE: 69 BPM | DIASTOLIC BLOOD PRESSURE: 70 MMHG | WEIGHT: 123 LBS | TEMPERATURE: 97.9 F | SYSTOLIC BLOOD PRESSURE: 100 MMHG | HEIGHT: 63.5 IN

## 2018-10-22 PROCEDURE — G0439: CPT

## 2018-12-05 NOTE — ED ADULT TRIAGE NOTE - HEART RATE (BEATS/MIN)
Progress Notes by Ricardo Renae MD at 12/01/17 04:18 PM     Author:  Ricardo Renae MD Service:  (none) Author Type:  Physician     Filed:  12/01/17 04:25 PM Encounter Date:  12/1/2017 Status:  Signed     :  Ricardo Renae MD (Physician)            SUBJECTIVE:   Deonna Hinkle is a 54 year old female who complains of[WO1.1T] lump and pain in left groin[WO1.1M] .[WO1.1T]  Dr. Soliz[WO1.1M] requests my opinion/advice concerning this problem    Patient reports history of[WO1.1T] bothersome[WO1.1M] bulge in[WO1.1T] left groin[WO1.1M].  It has been present for[WO1.1T]  weeks[WO1.1M].  She[WO1.1T] admits to[WO1.1M] bulge,[WO1.1T] admits to[WO1.1M] pain, Pain is described as[WO1.1T] sharp and achy, worse with riding a bike[WO1.1M].  There is[WO1.1T] no[WO1.1M] radiation.  Pain improved by[WO1.1T] rest[WO1.1M].  No history of incarceration or strangulation.     Urinary History:[WO1.1T] negative[WO1.1M]  Bowel Habits:[WO1.1T] without change[WO1.1M]  Additional History:[WO1.1T] Dr. Rosa plan some ovarian cyst surgery on December 14[WO1.1M]     PMH:  Allergies: Review of patient's allergies indicates no known allergies.  Current Outpatient Prescriptions     Medication  Sig   • hydrocortisone (ANUSOL-HC) 2.5 % rectal cream Use BID to affected area for 2 weeks.   • cyclobenzaprine (FLEXERIL) 5 MG tablet Take 1 Tab by mouth nightly as needed for Muscle spasms.   • Calcium-Vitamin D (CALCIUM + D OR) Take  by mouth.     Patient Active Problem List    Diagnosis    • Fracture of radial neck, right, closed   • Inconclusive mammography due to dense breasts   • Fibroadenoma   • Diffuse cystic mastopathy   • Vitiligo[WO1.1T]     Social History     Social History      • Marital status:       Spouse name: N/A   • Number of children:  N/A   • Years of education:  N/A     Occupational History      • [employer]       medical records      Social History Main Topics      • Smoking status:  Never Smoker   •  Smokeless tobacco:  Never Used   • Alcohol use  No   • Drug use:  No   • Sexual activity:  Yes     Partners: Male     Birth control/ protection: None     Other Topics  Concern   • Not on file      Social History Narrative     . Two kids (26, 19)     Family History      Problem  Relation Age of Onset   • High Blood Pressure Mother    • High Blood Pressure Father    • Heart Attack Sister 48[WO1.2T]       ROS:  No bleeding, CP, SOB.  All other systems reviewed and were negative.      OBJECTIVE:     Filed Vitals:     12/01/17 1558   BP: 118/68   Temp: 98 °F (36.7 °C)   TempSrc: Tympanic   Weight: 126 lb (57.2 kg)   Height: 5' 3\" (1.6 m)      female , NAD  Moves well, good nutrtion  Alert and oriented x 3  HEENT: PERRLA, EOMI, no scleral icterus  Neck: trachea midline, no JVD  Lungs:[WO1.1T] clear[WO1.1M]  Heart:[WO1.1T] regular rhythm[WO1.1M]  Abd: NT, no mass,  Hernia:[WO1.1T] easily reducible[WO1.1M] hernia of[WO1.1T] left groin[WO1.1M].[WO1.1T]  Has a lump in the labial area which is mobile[WO1.1M]  Rectal: deferred  EXT: No edema, cyanosis, clubbing  Neuro: Nl CN 2-12    Lab Results      Component  Value Date    HGB 13.9 11/22/2017    HCT 41.7 11/22/2017    MCV 91.2 11/22/2017    WBC 5.2 11/22/2017    ANC 2.3 11/22/2017    ALC 2.4 11/22/2017     11/22/2017        Lab Results      Component  Value Date    BUN 13 02/21/2017    CREAT 0.6 02/21/2017    CA 9.8 02/21/2017     02/21/2017    K 3.9 02/21/2017     02/21/2017    GLUCOSE 81 02/21/2017    CO2 28 02/21/2017    GFRNONAFAM >=60 05/20/2015    GFRAFAM >=60 05/20/2015     02/17/2016       ASSESSMENT:[WO1.1T]  left groin[WO1.1M] hernia     Discussed nature of hernias, their anatomy, etiology, previous treatments, and their risks(including incarceration/strangulation and associated symptoms). Reviewed treatment options: including observation, delayed and immediate surgical repair. Discussed various types of surgical repairs.  I went over  the risks (including but not limited to:pain ( including chronic),  infection, bleeding/hematoma, heart attack, death, nerve injury/entrapment, recurrence) and benefits of each .  Discussed both open and laparoscopic repairs along with their benefits and potential morbidity.  Questions answered. Informed consent obtained.     PLAN:[WO1.1T] Schedule surgical repair, open LIH  We will try to coordinate with gynecology for surgery on 12/14.[WO1.1M]    Electronically Signed by:    Ricardo Renae MD , 12/1/2017[WO1.1T]      Procedure:[WO1.3T] Hernia open  left inguinal[WO1.3M]   Diagnosis:[WO1.3T] hernia inguinal left[WO1.3M]  Location:[WO1.3T] [facility][WO1.3M]   Admit type:[WO1.3T] day surgery[WO1.3M]   Anesthesia:[WO1.3T] General[WO1.3M]  Length of case:[WO1.3T] 1 hour[WO1.3M]  Assistant:[WO1.3T] None[WO1.3M]  Position:[WO1.3T] supine[WO1.3M]  History and Physical:[WO1.3T] PCP[WO1.3M]  Patient medical status:[WO1.3T] moderate[WO1.3M]  Bowel prep:[WO1.3T] None[WO1.3M]  Blood bank:[WO1.3T] none[WO1.3M]  Antibiotics:[WO1.3T] Ancef (Cefazolin) 2gm IVPB on call to OR[WO1.3M]   Allergies: has No Known Allergies.  Special:[WO1.3T]  none[WO1.3M]  Post-op appointment:[WO1.3T] two weeks[WO1.3M]           Revision History        User Key Date/Time User Provider Type Action    > WO1.2 12/01/17 04:25 PM Ricardo Renae MD Physician Sign     WO1.1 12/01/17 04:22 PM Ricardo Renae MD Physician      WO1.3 12/01/17 04:18 PM Ricardo Renae MD Physician     M - Manual, T - Template             66

## 2018-12-06 VITALS — SYSTOLIC BLOOD PRESSURE: 112 MMHG | DIASTOLIC BLOOD PRESSURE: 62 MMHG

## 2018-12-06 NOTE — ASSESSMENT
[FreeTextEntry1] : She has no cardiac symptoms now and she sees her cardiologist.  She is not fasting today.  Check lipids with next blood tests.  Discussed diet and exercise.\par \par She sees Dr. Godoy- can check a urine next time.  \par \par She was counseled regarding the anxiety and Alprazolam was renewed.  \par \par She will follow-up with her hematologist, Dr. Fierro.\par \par She sees an endocrinologist for thyroid nodules.\par \par She sees her gyn.  Mammogram 1/18.  DEXA by gyn.\par \par She had the flu vaccine, vaccines UTD.  Shingrix advised when available.\par \par Colonoscopy and EGD 8/16.  She sees her gastroenterologist.

## 2018-12-06 NOTE — HISTORY OF PRESENT ILLNESS
[FreeTextEntry1] : The patient is here for a routine visit.  [de-identified] : She tries to be active.  Her diet is healthy.  \par \par No chest pan or dyspnea.  She is going to follow-up with her cardiologist.\par \par She has not recently seen her psychiatrist, Dr. Griffin.\par \par She saw Dr. Juarez Vicente.\par \par She saw her hematologist last week and is being monitored.

## 2018-12-06 NOTE — HEALTH RISK ASSESSMENT
[No falls in past year] : Patient reported no falls in the past year [0] : 2) Feeling down, depressed, or hopeless: Not at all (0) [Fully functional (bathing, dressing, toileting, transferring, walking, feeding)] : Fully functional (bathing, dressing, toileting, transferring, walking, feeding) [Fully functional (using the telephone, shopping, preparing meals, housekeeping, doing laundry, using] : Fully functional and needs no help or supervision to perform IADLs (using the telephone, shopping, preparing meals, housekeeping, doing laundry, using transportation, managing medications and managing finances) [] : No [[id number]] : 0 [Change in mental status noted] : No change in mental status noted [Reports changes in hearing] : Reports no changes in hearing [Reports changes in vision] : Reports no changes in vision [Reports changes in dental health] : Reports no changes in dental health

## 2018-12-11 ENCOUNTER — NON-APPOINTMENT (OUTPATIENT)
Age: 73
End: 2018-12-11

## 2018-12-11 ENCOUNTER — APPOINTMENT (OUTPATIENT)
Dept: INTERNAL MEDICINE | Facility: CLINIC | Age: 73
End: 2018-12-11
Payer: MEDICARE

## 2018-12-11 VITALS — DIASTOLIC BLOOD PRESSURE: 65 MMHG | SYSTOLIC BLOOD PRESSURE: 100 MMHG

## 2018-12-11 VITALS
OXYGEN SATURATION: 100 % | DIASTOLIC BLOOD PRESSURE: 60 MMHG | HEIGHT: 63.5 IN | WEIGHT: 120 LBS | TEMPERATURE: 98.3 F | BODY MASS INDEX: 21 KG/M2 | SYSTOLIC BLOOD PRESSURE: 110 MMHG | HEART RATE: 64 BPM

## 2018-12-11 DIAGNOSIS — R05 COUGH: ICD-10-CM

## 2018-12-11 DIAGNOSIS — R07.89 OTHER CHEST PAIN: ICD-10-CM

## 2018-12-11 PROCEDURE — 99214 OFFICE O/P EST MOD 30 MIN: CPT | Mod: 25

## 2018-12-11 PROCEDURE — 36415 COLL VENOUS BLD VENIPUNCTURE: CPT

## 2018-12-11 PROCEDURE — 93000 ELECTROCARDIOGRAM COMPLETE: CPT

## 2018-12-11 NOTE — DATA REVIEWED
[FreeTextEntry1] : EKG:  NSR 61, no acute changes, slight T wave flattening compared to last EKG done here in 2013.

## 2018-12-11 NOTE — PHYSICAL EXAM
[No Acute Distress] : no acute distress [Well Nourished] : well nourished [Well Developed] : well developed [No JVD] : no jugular venous distention [Supple] : supple [No Lymphadenopathy] : no lymphadenopathy [No Respiratory Distress] : no respiratory distress  [Clear to Auscultation] : lungs were clear to auscultation bilaterally [No Accessory Muscle Use] : no accessory muscle use [Normal Rate] : normal rate  [Regular Rhythm] : with a regular rhythm [Normal S1, S2] : normal S1 and S2 [No Murmur] : no murmur heard [Pedal Pulses Present] : the pedal pulses are present [No Edema] : there was no peripheral edema [Normal Appearance] : normal in appearance [No Masses] : no palpable masses [No Axillary Lymphadenopathy] : no axillary lymphadenopathy [Soft] : abdomen soft [de-identified] : +tenderness to press over the sternum and left medial rib cage.

## 2018-12-11 NOTE — ASSESSMENT
[FreeTextEntry1] : She has atypical chest pain.  It is not exertional and there is a component of tenderness of the chest wall.  She describes two pains next to each other- one is constant for seven days and the other is less constant for three days.  Neither is exertional and there is no dyspnea.  A cardiac cause is unlikely but given her ASHD and recent cardiac issues she was advised to see her cardiologist to be thorough.  She should go to the ER if it gets worse.\par \par A costochondritis is more likely and Tylenol was advised.  Avoid NASIDS given the Effient.\par \par She probably also has a URI component and the pains might be exacerbated by the cough.  Start Augmentin- note she is going on vacation next week.\par \par She requests lipids to be checked.\par \par She also requests a CBC and iron studies.  She is on ferrous sulfate and she is seeing Dr. Fierro for the anemia.

## 2018-12-11 NOTE — HISTORY OF PRESENT ILLNESS
[FreeTextEntry8] : The patient has had a nonproductive cough, sinus pain, head pain, nasal drip, chills, previous sore throat for about 7-8 days.  No new dyspnea, leg pain, fever.  She used Flonase and Tylenol.  She now says she has two chest pains which she describes differently but are right next to each other over the sternum and left medial chest.  It is constant but she feels it more to press on it.  There is worse pain if she breathes or coughs.  It is not related to exertion and doesn't get worse when she is walking.  It is over the breast- no breast lumps felt.

## 2018-12-13 LAB
ALBUMIN SERPL ELPH-MCNC: 4.3 G/DL
ALP BLD-CCNC: 92 U/L
ALT SERPL-CCNC: 22 U/L
ANION GAP SERPL CALC-SCNC: 13 MMOL/L
AST SERPL-CCNC: 26 U/L
BASOPHILS # BLD AUTO: 0.01 K/UL
BASOPHILS NFR BLD AUTO: 0.2 %
BILIRUB SERPL-MCNC: 0.3 MG/DL
BUN SERPL-MCNC: 14 MG/DL
CALCIUM SERPL-MCNC: 9.7 MG/DL
CHLORIDE SERPL-SCNC: 104 MMOL/L
CHOLEST SERPL-MCNC: 153 MG/DL
CHOLEST/HDLC SERPL: 2.4 RATIO
CO2 SERPL-SCNC: 26 MMOL/L
CREAT SERPL-MCNC: 0.73 MG/DL
EOSINOPHIL # BLD AUTO: 0.14 K/UL
EOSINOPHIL NFR BLD AUTO: 2.9 %
FERRITIN SERPL-MCNC: 73 NG/ML
GLUCOSE SERPL-MCNC: 83 MG/DL
HCT VFR BLD CALC: 35.1 %
HDLC SERPL-MCNC: 63 MG/DL
HGB BLD-MCNC: 10.6 G/DL
IMM GRANULOCYTES NFR BLD AUTO: 0 %
IRON SATN MFR SERPL: 28 %
IRON SERPL-MCNC: 75 UG/DL
LDLC SERPL CALC-MCNC: 73 MG/DL
LYMPHOCYTES # BLD AUTO: 1.17 K/UL
LYMPHOCYTES NFR BLD AUTO: 24.2 %
MAN DIFF?: NORMAL
MCHC RBC-ENTMCNC: 26.8 PG
MCHC RBC-ENTMCNC: 30.2 GM/DL
MCV RBC AUTO: 88.9 FL
MONOCYTES # BLD AUTO: 0.44 K/UL
MONOCYTES NFR BLD AUTO: 9.1 %
NEUTROPHILS # BLD AUTO: 3.07 K/UL
NEUTROPHILS NFR BLD AUTO: 63.6 %
PLATELET # BLD AUTO: 247 K/UL
POTASSIUM SERPL-SCNC: 4.5 MMOL/L
PROT SERPL-MCNC: 7.1 G/DL
RBC # BLD: 3.95 M/UL
RBC # FLD: 14.6 %
SODIUM SERPL-SCNC: 143 MMOL/L
TIBC SERPL-MCNC: 271 UG/DL
TRIGL SERPL-MCNC: 86 MG/DL
UIBC SERPL-MCNC: 196 UG/DL
WBC # FLD AUTO: 4.83 K/UL

## 2018-12-26 ENCOUNTER — MEDICATION RENEWAL (OUTPATIENT)
Age: 73
End: 2018-12-26

## 2019-03-11 ENCOUNTER — APPOINTMENT (OUTPATIENT)
Dept: INTERNAL MEDICINE | Facility: CLINIC | Age: 74
End: 2019-03-11

## 2019-03-18 ENCOUNTER — APPOINTMENT (OUTPATIENT)
Dept: INTERNAL MEDICINE | Facility: CLINIC | Age: 74
End: 2019-03-18
Payer: MEDICARE

## 2019-03-18 VITALS
DIASTOLIC BLOOD PRESSURE: 60 MMHG | HEIGHT: 63.5 IN | WEIGHT: 120 LBS | SYSTOLIC BLOOD PRESSURE: 90 MMHG | BODY MASS INDEX: 21 KG/M2 | TEMPERATURE: 98.2 F | OXYGEN SATURATION: 98 % | HEART RATE: 67 BPM

## 2019-03-18 VITALS — DIASTOLIC BLOOD PRESSURE: 68 MMHG | SYSTOLIC BLOOD PRESSURE: 105 MMHG

## 2019-03-18 PROCEDURE — 99214 OFFICE O/P EST MOD 30 MIN: CPT

## 2019-03-18 RX ORDER — AMOXICILLIN AND CLAVULANATE POTASSIUM 875; 125 MG/1; MG/1
875-125 TABLET, COATED ORAL TWICE DAILY
Qty: 14 | Refills: 0 | Status: DISCONTINUED | COMMUNITY
Start: 2018-12-11 | End: 2019-03-18

## 2019-03-18 NOTE — ASSESSMENT
[FreeTextEntry1] : The patient has multiple symptoms in different areas and anxiety and depression are likely.  She appears well.  She has a left forehead headache but an acute sinusitis is unlikely and she should avoid antibiotics for now.  She can use Tylenol PRN.  Consider an ENT evaluation if it gets worse.  Temporal arteritis is unlikely- it has been previously considered for similar symptoms and ESR and CRP were normal.  She also has mild URI symptoms along with it.  Monitor for now.\par \par She has B/L hand and feet tingling.  Her vascular exam is normal and a vascular cause is unlikely.  A new neurologic cause is also unlikely.  She does have an appointment to see a neurologist (Dr. Haider) today and that is reasonable.  She was reassured.\par \par She should try to be active and have a healthy diet.

## 2019-03-18 NOTE — HISTORY OF PRESENT ILLNESS
[FreeTextEntry8] : The patient was in the ER last week at [facility] with atypical chest pain and she had an unremarkable evaluation.\par \par She now has tingling in her B/L hands and feet. She feels like her feet "fall asleep.". It has been going on for six weeks and is worse in the cold.  It doesn't limit activities.  She does have a history of Raynaud's.  No swelling or redness.  \par \par She has had a headache for about a month.  It is mainly over her right forehead, not the temple. There is a mild sore throat, clear nasal discharge, achiness, fatigue.  No fever, yellow-green phlegm.\par \par She has other symptoms including pains, malaise, dizziness.

## 2019-03-18 NOTE — PHYSICAL EXAM
[No Acute Distress] : no acute distress [Well Nourished] : well nourished [Well Developed] : well developed [Normal Outer Ear/Nose] : the outer ears and nose were normal in appearance [Normal Oropharynx] : the oropharynx was normal [No JVD] : no jugular venous distention [Supple] : supple [No Lymphadenopathy] : no lymphadenopathy [No Respiratory Distress] : no respiratory distress  [Clear to Auscultation] : lungs were clear to auscultation bilaterally [No Accessory Muscle Use] : no accessory muscle use [Normal Rate] : normal rate  [Regular Rhythm] : with a regular rhythm [Pedal Pulses Present] : the pedal pulses are present [Normal S1, S2] : normal S1 and S2 [No Edema] : there was no peripheral edema [Soft] : abdomen soft [Non-distended] : non-distended [Non Tender] : non-tender [No HSM] : no HSM [Coordination Grossly Intact] : coordination grossly intact [Normal Gait] : normal gait [No Focal Deficits] : no focal deficits [de-identified] : 2+ temproal artery pulses.

## 2019-03-28 ENCOUNTER — RESULT REVIEW (OUTPATIENT)
Age: 74
End: 2019-03-28

## 2019-07-25 ENCOUNTER — APPOINTMENT (OUTPATIENT)
Dept: INTERNAL MEDICINE | Facility: CLINIC | Age: 74
End: 2019-07-25
Payer: MEDICARE

## 2019-07-25 VITALS
OXYGEN SATURATION: 99 % | HEIGHT: 63.5 IN | TEMPERATURE: 98.4 F | HEART RATE: 68 BPM | BODY MASS INDEX: 21.17 KG/M2 | WEIGHT: 121 LBS

## 2019-07-25 DIAGNOSIS — M79.604 PAIN IN RIGHT LEG: ICD-10-CM

## 2019-07-25 DIAGNOSIS — M79.605 PAIN IN RIGHT LEG: ICD-10-CM

## 2019-07-25 PROCEDURE — 99214 OFFICE O/P EST MOD 30 MIN: CPT | Mod: 25

## 2019-07-25 PROCEDURE — 36415 COLL VENOUS BLD VENIPUNCTURE: CPT

## 2019-07-26 ENCOUNTER — TRANSCRIPTION ENCOUNTER (OUTPATIENT)
Age: 74
End: 2019-07-26

## 2019-07-27 VITALS — SYSTOLIC BLOOD PRESSURE: 90 MMHG | DIASTOLIC BLOOD PRESSURE: 60 MMHG

## 2019-07-27 LAB
ALBUMIN SERPL ELPH-MCNC: 4.5 G/DL
ALP BLD-CCNC: 97 U/L
ALT SERPL-CCNC: 19 U/L
ANION GAP SERPL CALC-SCNC: 11 MMOL/L
AST SERPL-CCNC: 19 U/L
BASOPHILS # BLD AUTO: 0.02 K/UL
BASOPHILS NFR BLD AUTO: 0.3 %
BILIRUB SERPL-MCNC: 0.2 MG/DL
BUN SERPL-MCNC: 18 MG/DL
CALCIUM SERPL-MCNC: 9.9 MG/DL
CHLORIDE SERPL-SCNC: 101 MMOL/L
CK SERPL-CCNC: 181 U/L
CO2 SERPL-SCNC: 27 MMOL/L
CREAT SERPL-MCNC: 0.88 MG/DL
DEPRECATED D DIMER PPP IA-ACNC: 212 NG/ML DDU
EOSINOPHIL # BLD AUTO: 0.12 K/UL
EOSINOPHIL NFR BLD AUTO: 1.9 %
ERYTHROCYTE [SEDIMENTATION RATE] IN BLOOD BY WESTERGREN METHOD: 6 MM/HR
GLUCOSE SERPL-MCNC: 104 MG/DL
HCT VFR BLD CALC: 36.1 %
HGB BLD-MCNC: 11 G/DL
IMM GRANULOCYTES NFR BLD AUTO: 0.3 %
LYMPHOCYTES # BLD AUTO: 1.46 K/UL
LYMPHOCYTES NFR BLD AUTO: 23.4 %
MAN DIFF?: NORMAL
MCHC RBC-ENTMCNC: 27.3 PG
MCHC RBC-ENTMCNC: 30.5 GM/DL
MCV RBC AUTO: 89.6 FL
MONOCYTES # BLD AUTO: 0.37 K/UL
MONOCYTES NFR BLD AUTO: 5.9 %
NEUTROPHILS # BLD AUTO: 4.25 K/UL
NEUTROPHILS NFR BLD AUTO: 68.2 %
PLATELET # BLD AUTO: 261 K/UL
POTASSIUM SERPL-SCNC: 4 MMOL/L
PROT SERPL-MCNC: 6.7 G/DL
RBC # BLD: 4.03 M/UL
RBC # FLD: 14 %
SODIUM SERPL-SCNC: 139 MMOL/L
T4 FREE SERPL-MCNC: 1.1 NG/DL
TSH SERPL-ACNC: 1.19 UIU/ML
WBC # FLD AUTO: 6.24 K/UL

## 2019-07-27 NOTE — HISTORY OF PRESENT ILLNESS
[FreeTextEntry8] : The patient has a drawing pain of the posterior legs B/L which is new.  There is pain to lie down and rest.  It isn't worse with exertion.  It can keep her from sleeping and it keeps her awake.  It is from the B/L posterior knee down.  It is an ache and not a cramp.  It doesn't limit activities.  No swelling or redness. \par \par She is seeing Dr. Griffin.  She has had insomnia and medications were changed- Citalopram stopped and Fluoxetine 20 mg started.  She has started Trazodone 50 mg QHS and Xanax lowered to 0.5 mg QHS.\par \par She is also having right knee pain and she is being seen at [facility].\par \par She has a dull H/A.

## 2019-07-27 NOTE — ASSESSMENT
[FreeTextEntry1] : She has B/L leg pain.  No specific pathology seen.  She appears well.  Consider myopathy from the statin.  Consider PMR although not much UE symptoms.\par \par A DVT is unlikely- no swelling.  Check a D-dimer.  \par \par The BP is fairly low.  PO fluids advised and check a metabolic and TFTs.  \par \par She is being seen by her psychiatrist for insomnia and anxiety.

## 2019-08-22 ENCOUNTER — MEDICATION RENEWAL (OUTPATIENT)
Age: 74
End: 2019-08-22

## 2019-08-23 ENCOUNTER — RX RENEWAL (OUTPATIENT)
Age: 74
End: 2019-08-23

## 2019-09-15 ENCOUNTER — RX RENEWAL (OUTPATIENT)
Age: 74
End: 2019-09-15

## 2019-10-07 ENCOUNTER — EMERGENCY (EMERGENCY)
Facility: HOSPITAL | Age: 74
LOS: 1 days | Discharge: HOME | End: 2019-10-07
Attending: EMERGENCY MEDICINE
Payer: MEDICARE

## 2019-10-07 VITALS
OXYGEN SATURATION: 99 % | HEIGHT: 63 IN | DIASTOLIC BLOOD PRESSURE: 80 MMHG | WEIGHT: 121.92 LBS | TEMPERATURE: 97 F | RESPIRATION RATE: 19 BRPM | SYSTOLIC BLOOD PRESSURE: 123 MMHG | HEART RATE: 62 BPM

## 2019-10-07 DIAGNOSIS — Z96.651 PRESENCE OF RIGHT ARTIFICIAL KNEE JOINT: Chronic | ICD-10-CM

## 2019-10-07 LAB
ALBUMIN SERPL ELPH-MCNC: 4.7 G/DL — SIGNIFICANT CHANGE UP (ref 3.3–5)
ALP SERPL-CCNC: 113 U/L — SIGNIFICANT CHANGE UP (ref 40–120)
ALT FLD-CCNC: 23 U/L — SIGNIFICANT CHANGE UP (ref 10–45)
ANION GAP SERPL CALC-SCNC: 10 MMOL/L — SIGNIFICANT CHANGE UP (ref 5–17)
APTT BLD: 27.9 SEC — SIGNIFICANT CHANGE UP (ref 27.5–36.3)
AST SERPL-CCNC: 19 U/L — SIGNIFICANT CHANGE UP (ref 10–40)
BASOPHILS # BLD AUTO: 0.03 K/UL — SIGNIFICANT CHANGE UP (ref 0–0.2)
BASOPHILS NFR BLD AUTO: 0.4 % — SIGNIFICANT CHANGE UP (ref 0–2)
BILIRUB SERPL-MCNC: 0.3 MG/DL — SIGNIFICANT CHANGE UP (ref 0.2–1.2)
BUN SERPL-MCNC: 18 MG/DL — SIGNIFICANT CHANGE UP (ref 7–23)
CALCIUM SERPL-MCNC: 9.5 MG/DL — SIGNIFICANT CHANGE UP (ref 8.4–10.5)
CHLORIDE SERPL-SCNC: 101 MMOL/L — SIGNIFICANT CHANGE UP (ref 96–108)
CO2 SERPL-SCNC: 28 MMOL/L — SIGNIFICANT CHANGE UP (ref 22–31)
CREAT SERPL-MCNC: 0.75 MG/DL — SIGNIFICANT CHANGE UP (ref 0.5–1.3)
EOSINOPHIL # BLD AUTO: 0.11 K/UL — SIGNIFICANT CHANGE UP (ref 0–0.5)
EOSINOPHIL NFR BLD AUTO: 1.3 % — SIGNIFICANT CHANGE UP (ref 0–6)
GLUCOSE SERPL-MCNC: 103 MG/DL — HIGH (ref 70–99)
HCT VFR BLD CALC: 37.8 % — SIGNIFICANT CHANGE UP (ref 34.5–45)
HGB BLD-MCNC: 12.1 G/DL — SIGNIFICANT CHANGE UP (ref 11.5–15.5)
IMM GRANULOCYTES NFR BLD AUTO: 0.4 % — SIGNIFICANT CHANGE UP (ref 0–1.5)
INR BLD: 1.04 RATIO — SIGNIFICANT CHANGE UP (ref 0.88–1.16)
LYMPHOCYTES # BLD AUTO: 1 K/UL — SIGNIFICANT CHANGE UP (ref 1–3.3)
LYMPHOCYTES # BLD AUTO: 12.2 % — LOW (ref 13–44)
MCHC RBC-ENTMCNC: 27.6 PG — SIGNIFICANT CHANGE UP (ref 27–34)
MCHC RBC-ENTMCNC: 32 GM/DL — SIGNIFICANT CHANGE UP (ref 32–36)
MCV RBC AUTO: 86.1 FL — SIGNIFICANT CHANGE UP (ref 80–100)
MONOCYTES # BLD AUTO: 0.39 K/UL — SIGNIFICANT CHANGE UP (ref 0–0.9)
MONOCYTES NFR BLD AUTO: 4.8 % — SIGNIFICANT CHANGE UP (ref 2–14)
NEUTROPHILS # BLD AUTO: 6.62 K/UL — SIGNIFICANT CHANGE UP (ref 1.8–7.4)
NEUTROPHILS NFR BLD AUTO: 80.9 % — HIGH (ref 43–77)
NRBC # BLD: 0 /100 WBCS — SIGNIFICANT CHANGE UP (ref 0–0)
PLATELET # BLD AUTO: 226 K/UL — SIGNIFICANT CHANGE UP (ref 150–400)
POTASSIUM SERPL-MCNC: 3.5 MMOL/L — SIGNIFICANT CHANGE UP (ref 3.5–5.3)
POTASSIUM SERPL-SCNC: 3.5 MMOL/L — SIGNIFICANT CHANGE UP (ref 3.5–5.3)
PROT SERPL-MCNC: 7.8 G/DL — SIGNIFICANT CHANGE UP (ref 6–8.3)
PROTHROM AB SERPL-ACNC: 11.9 SEC — SIGNIFICANT CHANGE UP (ref 10–12.9)
RBC # BLD: 4.39 M/UL — SIGNIFICANT CHANGE UP (ref 3.8–5.2)
RBC # FLD: 14.2 % — SIGNIFICANT CHANGE UP (ref 10.3–14.5)
SODIUM SERPL-SCNC: 139 MMOL/L — SIGNIFICANT CHANGE UP (ref 135–145)
WBC # BLD: 8.18 K/UL — SIGNIFICANT CHANGE UP (ref 3.8–10.5)
WBC # FLD AUTO: 8.18 K/UL — SIGNIFICANT CHANGE UP (ref 3.8–10.5)

## 2019-10-07 PROCEDURE — 99284 EMERGENCY DEPT VISIT MOD MDM: CPT | Mod: GC

## 2019-10-07 RX ORDER — MORPHINE SULFATE 50 MG/1
2 CAPSULE, EXTENDED RELEASE ORAL ONCE
Refills: 0 | Status: DISCONTINUED | OUTPATIENT
Start: 2019-10-07 | End: 2019-10-07

## 2019-10-07 RX ORDER — ACETAMINOPHEN 500 MG
975 TABLET ORAL ONCE
Refills: 0 | Status: COMPLETED | OUTPATIENT
Start: 2019-10-07 | End: 2019-10-07

## 2019-10-07 RX ORDER — ONDANSETRON 8 MG/1
4 TABLET, FILM COATED ORAL ONCE
Refills: 0 | Status: COMPLETED | OUTPATIENT
Start: 2019-10-07 | End: 2019-10-07

## 2019-10-07 RX ADMIN — Medication 975 MILLIGRAM(S): at 22:56

## 2019-10-07 RX ADMIN — Medication 975 MILLIGRAM(S): at 23:50

## 2019-10-07 RX ADMIN — ONDANSETRON 4 MILLIGRAM(S): 8 TABLET, FILM COATED ORAL at 22:00

## 2019-10-07 NOTE — ED ADULT NURSE NOTE - OBJECTIVE STATEMENT
75 y/o female presents to ED reporting LLQ pain since yesterday night. Pt also reports nausea. On exam, AOx3, speaking in complete sentences. Lung sounds CTA, NAD. Abdomen soft, tender LLQ, non-distended, normoactive bowel sounds. Pt denies CP, SOB, n/v/d, fever/chills at this time. Heplock placed, labs sent. Seen and evaluated by MD. Awaiting CT scan, pt provided and currently drinking oral contrast drink. Awaiting medication for pain at this time.

## 2019-10-07 NOTE — ED PROVIDER NOTE - RAPID ASSESSMENT
74y F hx CAD w stents on ASA and effient, HLD, neuropathy, depression, gerd, here with RLQ pain. Pt seen at urgent Care today at 6:25pm, had ng UA and sent in for r/o appy. Pain started last night, worsened throughout course of day. Currently 10/10. Pressure like pain. Constant, non radiating. No urinary sx. No fever. +Chills. +Nausea. No vomiting. No change in stooling, no diarrhea. Last BM today 4:30pm.   PCP Dr Germain Hart  GI Dr Juarez Vicente  Cardio- [facility]

## 2019-10-07 NOTE — ED PROVIDER NOTE - PROGRESS NOTE DETAILS
Resident: Jason Perez – Pt was re-evaluated at bedside, VSS, feeling better overall, tolerating PO. Results were discussed with patient as well as return precautions and follow up plan with PCP and/or specialist. Time was taken to answer any questions that the patient had before providing them with discharge paperwork. Dr. Eugene (Attending Physician)  Signed out to me. WBC 8, CT read without appendicitis but appendix not visualized patient tolerating PO pain improved. Will dc home given good return precautions for appendicitis.

## 2019-10-07 NOTE — ED ADULT NURSE NOTE - NSIMPLEMENTINTERV_GEN_ALL_ED
Implemented All Universal Safety Interventions:  [place] to call system. Call bell, personal items and telephone within reach. Instruct patient to call for assistance. Room bathroom lighting operational. Non-slip footwear when patient is off stretcher. Physically safe environment: no spills, clutter or unnecessary equipment. Stretcher in lowest position, wheels locked, appropriate side rails in place.

## 2019-10-07 NOTE — ED PROVIDER NOTE - PATIENT PORTAL LINK FT
You can access the FollowMyHealth Patient Portal offered by [facility] by registering at the following website: [email or web address]. By joining [facility]’s FollowMyHealth portal, you will also be able to view your health information using other applications (apps) compatible with our system.

## 2019-10-07 NOTE — ED PROVIDER NOTE - ATTENDING CONTRIBUTION TO CARE
attending Kenneth: 74yF h/o CAD with stents on prasugrel and ASA 81 p/w RLQ pain x 1 day with assoc nausea without vomiting. Denies fever. No prior abdominal surgery. Tender in RLQ with negative Rovsing sign, no CVAT. Concern for appendicitis vs colitis. Will obtain labs, UA, CT A/P, pain and nausea control, reassess.

## 2019-10-07 NOTE — ED PROVIDER NOTE - CLINICAL SUMMARY MEDICAL DECISION MAKING FREE TEXT BOX
74 Y F with RLQ abdominal pain, nausea x 24 hours, no sx hx. TTP localized to RLQ on exam, doubt pancreatitis, gallbladder, diverticulitis. Feel likely appendicitis vs colitis will give pain control, labs, CT AP. Dispo pending CT scan.

## 2019-10-07 NOTE — ED PROVIDER NOTE - OBJECTIVE STATEMENT
74 Y F with hx of CAD with stents on prasugrel and a daily baby asa. She says that she had pain starting last night she was having some lower abdominal pain that migrated now to her RLQ . She denies pain like this before. She admits to nausea starting today, she was having trouble tolerating PO. Denies fever. She has no abdominal surgical history. Pain is now constant, sharp, no radiation just in the RLQ at the moment. She says pain is 10/10. Denies dysuria, urgency or frequency. She tried taking Tylenol at home without any relief. She says that she has been going to the bathroom normally for the past 2 days. She denies hx of smoking.

## 2019-10-08 ENCOUNTER — TRANSCRIPTION ENCOUNTER (OUTPATIENT)
Age: 74
End: 2019-10-08

## 2019-10-08 VITALS
SYSTOLIC BLOOD PRESSURE: 116 MMHG | OXYGEN SATURATION: 95 % | DIASTOLIC BLOOD PRESSURE: 68 MMHG | TEMPERATURE: 98 F | HEART RATE: 60 BPM | RESPIRATION RATE: 16 BRPM

## 2019-10-08 LAB
APPEARANCE UR: CLEAR — SIGNIFICANT CHANGE UP
BACTERIA # UR AUTO: NEGATIVE — SIGNIFICANT CHANGE UP
BILIRUB UR-MCNC: NEGATIVE — SIGNIFICANT CHANGE UP
COLOR SPEC: COLORLESS — SIGNIFICANT CHANGE UP
CULTURE RESULTS: SIGNIFICANT CHANGE UP
DIFF PNL FLD: NEGATIVE — SIGNIFICANT CHANGE UP
EPI CELLS # UR: 1 /HPF — SIGNIFICANT CHANGE UP
GLUCOSE UR QL: NEGATIVE — SIGNIFICANT CHANGE UP
HYALINE CASTS # UR AUTO: 0 /LPF — SIGNIFICANT CHANGE UP (ref 0–2)
KETONES UR-MCNC: NEGATIVE — SIGNIFICANT CHANGE UP
LEUKOCYTE ESTERASE UR-ACNC: ABNORMAL
NITRITE UR-MCNC: NEGATIVE — SIGNIFICANT CHANGE UP
PH UR: 6 — SIGNIFICANT CHANGE UP (ref 5–8)
PROT UR-MCNC: NEGATIVE — SIGNIFICANT CHANGE UP
RBC CASTS # UR COMP ASSIST: 39 /HPF — HIGH (ref 0–4)
SP GR SPEC: 1 — LOW (ref 1.01–1.02)
SPECIMEN SOURCE: SIGNIFICANT CHANGE UP
UROBILINOGEN FLD QL: NEGATIVE — SIGNIFICANT CHANGE UP
WBC UR QL: 5 /HPF — SIGNIFICANT CHANGE UP (ref 0–5)

## 2019-10-08 PROCEDURE — 99284 EMERGENCY DEPT VISIT MOD MDM: CPT | Mod: 25

## 2019-10-08 PROCEDURE — 96374 THER/PROPH/DIAG INJ IV PUSH: CPT | Mod: XU

## 2019-10-08 PROCEDURE — 74177 CT ABD & PELVIS W/CONTRAST: CPT

## 2019-10-08 PROCEDURE — 74177 CT ABD & PELVIS W/CONTRAST: CPT | Mod: 26

## 2019-10-08 PROCEDURE — 83690 ASSAY OF LIPASE: CPT

## 2019-10-08 PROCEDURE — 85730 THROMBOPLASTIN TIME PARTIAL: CPT

## 2019-10-08 PROCEDURE — 85027 COMPLETE CBC AUTOMATED: CPT

## 2019-10-08 PROCEDURE — 81001 URINALYSIS AUTO W/SCOPE: CPT

## 2019-10-08 PROCEDURE — 85610 PROTHROMBIN TIME: CPT

## 2019-10-08 PROCEDURE — 87086 URINE CULTURE/COLONY COUNT: CPT

## 2019-10-08 PROCEDURE — 80053 COMPREHEN METABOLIC PANEL: CPT

## 2019-10-08 RX ADMIN — MORPHINE SULFATE 2 MILLIGRAM(S): 50 CAPSULE, EXTENDED RELEASE ORAL at 00:39

## 2019-10-08 RX ADMIN — MORPHINE SULFATE 2 MILLIGRAM(S): 50 CAPSULE, EXTENDED RELEASE ORAL at 01:00

## 2019-10-08 NOTE — ED ADULT NURSE REASSESSMENT NOTE - NS ED NURSE REASSESS COMMENT FT1
Pt reports having bilateral arm itchiness, MD Perez aware and will evaluated pt. Awaiting CT scan results at this time.

## 2019-10-14 ENCOUNTER — APPOINTMENT (OUTPATIENT)
Dept: SURGERY | Facility: CLINIC | Age: 74
End: 2019-10-14
Payer: MEDICARE

## 2019-10-14 VITALS
WEIGHT: 118 LBS | RESPIRATION RATE: 15 BRPM | OXYGEN SATURATION: 96 % | HEART RATE: 60 BPM | BODY MASS INDEX: 20.65 KG/M2 | SYSTOLIC BLOOD PRESSURE: 106 MMHG | DIASTOLIC BLOOD PRESSURE: 67 MMHG | HEIGHT: 63.5 IN | TEMPERATURE: 98.5 F

## 2019-10-14 DIAGNOSIS — Z78.9 OTHER SPECIFIED HEALTH STATUS: ICD-10-CM

## 2019-10-14 PROCEDURE — 99204 OFFICE O/P NEW MOD 45 MIN: CPT

## 2019-10-14 RX ORDER — DOXYCYCLINE 40 MG/1
40 CAPSULE ORAL
Qty: 30 | Refills: 0 | Status: DISCONTINUED | COMMUNITY
Start: 2018-05-24 | End: 2019-10-14

## 2019-10-14 RX ORDER — MECLIZINE HYDROCHLORIDE 12.5 MG/1
12.5 TABLET ORAL 3 TIMES DAILY
Qty: 30 | Refills: 1 | Status: DISCONTINUED | COMMUNITY
Start: 2018-07-06 | End: 2019-10-14

## 2019-10-14 RX ORDER — AMOXICILLIN AND CLAVULANATE POTASSIUM 875; 125 MG/1; MG/1
875-125 TABLET, COATED ORAL
Qty: 20 | Refills: 0 | Status: DISCONTINUED | COMMUNITY
Start: 2019-08-22 | End: 2019-10-14

## 2019-10-14 RX ORDER — OMEPRAZOLE 40 MG/1
40 CAPSULE, DELAYED RELEASE ORAL
Refills: 0 | Status: ACTIVE | COMMUNITY

## 2019-10-14 NOTE — CONSULT LETTER
[Dear  ___] : Dear  [unfilled], [Please see my note below.] : Please see my note below. [Consult Letter:] : I had the pleasure of evaluating your patient, [unfilled]. [Consult Closing:] : Thank you very much for allowing me to participate in the care of this patient.  If you have any questions, please do not hesitate to contact me. [Sincerely,] : Sincerely, [FreeTextEntry3] : Abdoul Red MD, FACS, FASMBS\par Chief Division of Minimally Invasive Surgery\par Co-Director of Bariatric Surgery \par [facility]\[place] [address]

## 2019-10-14 NOTE — HISTORY OF PRESENT ILLNESS
[de-identified] : Giulia is a 75 y/o female here for a consultation for RLQ abdominal pain. Was in [facility] ED 10/8.CT unremarkable. Urine culture negative.This discomfort seems to be point tender and has been present for approximately 8 days there is no fever or chills no nausea or vomiting and she is moving her bowels normally

## 2019-10-14 NOTE — CONSULT LETTER
[Dear  ___] : Dear  [unfilled], [Please see my note below.] : Please see my note below. [Consult Letter:] : I had the pleasure of evaluating your patient, [unfilled]. [Sincerely,] : Sincerely, [Consult Closing:] : Thank you very much for allowing me to participate in the care of this patient.  If you have any questions, please do not hesitate to contact me. [FreeTextEntry3] : Abdoul Red MD, FACS, FASMBS\par Chief Division of Minimally Invasive Surgery\par Co-Director of Bariatric Surgery \par [facility]\[place] [address]

## 2019-10-14 NOTE — ASSESSMENT
[FreeTextEntry1] : 74-year-old female with a day history of right periumbilical abdominal discomfort which appears to be point tender. There is no obvious hernia is present on physical exam and/or CAT scan and there were no other abnormalities consistent with this abdominal pain seen on the CAT scan. I recommended no surgery at the present time she will return if there was any changes all of their questions were answered the

## 2019-10-14 NOTE — HISTORY OF PRESENT ILLNESS
[de-identified] : Giulia is a 75 y/o female here for a consultation for RLQ abdominal pain. Was in [facility] ED 10/8.CT unremarkable. Urine culture negative.This discomfort seems to be point tender and has been present for approximately 8 days there is no fever or chills no nausea or vomiting and she is moving her bowels normally

## 2019-10-18 ENCOUNTER — APPOINTMENT (OUTPATIENT)
Dept: INTERNAL MEDICINE | Facility: CLINIC | Age: 74
End: 2019-10-18
Payer: MEDICARE

## 2019-10-18 VITALS
HEART RATE: 64 BPM | TEMPERATURE: 97.6 F | OXYGEN SATURATION: 97 % | BODY MASS INDEX: 20.66 KG/M2 | WEIGHT: 121 LBS | HEIGHT: 64 IN

## 2019-10-18 PROCEDURE — 36415 COLL VENOUS BLD VENIPUNCTURE: CPT

## 2019-10-18 PROCEDURE — G0439: CPT

## 2019-10-18 PROCEDURE — G0444 DEPRESSION SCREEN ANNUAL: CPT | Mod: 59

## 2019-10-20 VITALS — SYSTOLIC BLOOD PRESSURE: 108 MMHG | DIASTOLIC BLOOD PRESSURE: 62 MMHG

## 2019-10-20 LAB
25(OH)D3 SERPL-MCNC: 65.8 NG/ML
ALBUMIN SERPL ELPH-MCNC: 4.6 G/DL
ALP BLD-CCNC: 108 U/L
ALT SERPL-CCNC: 19 U/L
ANION GAP SERPL CALC-SCNC: 15 MMOL/L
APPEARANCE: ABNORMAL
AST SERPL-CCNC: 18 U/L
BACTERIA: NEGATIVE
BASOPHILS # BLD AUTO: 0.04 K/UL
BASOPHILS NFR BLD AUTO: 0.8 %
BILIRUB SERPL-MCNC: 0.3 MG/DL
BILIRUBIN URINE: ABNORMAL
BLOOD URINE: NEGATIVE
BUN SERPL-MCNC: 19 MG/DL
CALCIUM SERPL-MCNC: 10.1 MG/DL
CHLORIDE SERPL-SCNC: 101 MMOL/L
CHOLEST SERPL-MCNC: 172 MG/DL
CHOLEST/HDLC SERPL: 2.5 RATIO
CO2 SERPL-SCNC: 24 MMOL/L
COLOR: YELLOW
CREAT SERPL-MCNC: 0.76 MG/DL
EOSINOPHIL # BLD AUTO: 0.09 K/UL
EOSINOPHIL NFR BLD AUTO: 1.8 %
ESTIMATED AVERAGE GLUCOSE: 108 MG/DL
GLUCOSE QUALITATIVE U: NEGATIVE
GLUCOSE SERPL-MCNC: 93 MG/DL
HBA1C MFR BLD HPLC: 5.4 %
HCT VFR BLD CALC: 38.3 %
HDLC SERPL-MCNC: 69 MG/DL
HGB BLD-MCNC: 11.5 G/DL
HYALINE CASTS: 0 /LPF
IMM GRANULOCYTES NFR BLD AUTO: 0.4 %
KETONES URINE: NEGATIVE
LDLC SERPL CALC-MCNC: 88 MG/DL
LEUKOCYTE ESTERASE URINE: ABNORMAL
LYMPHOCYTES # BLD AUTO: 1.35 K/UL
LYMPHOCYTES NFR BLD AUTO: 26.8 %
MAN DIFF?: NORMAL
MCHC RBC-ENTMCNC: 27.4 PG
MCHC RBC-ENTMCNC: 30 GM/DL
MCV RBC AUTO: 91.4 FL
MICROSCOPIC-UA: NORMAL
MONOCYTES # BLD AUTO: 0.35 K/UL
MONOCYTES NFR BLD AUTO: 7 %
NEUTROPHILS # BLD AUTO: 3.18 K/UL
NEUTROPHILS NFR BLD AUTO: 63.2 %
NITRITE URINE: NEGATIVE
PH URINE: 6
PLATELET # BLD AUTO: 273 K/UL
POTASSIUM SERPL-SCNC: 4.1 MMOL/L
PROT SERPL-MCNC: 6.8 G/DL
PROTEIN URINE: NORMAL
RBC # BLD: 4.19 M/UL
RBC # FLD: 14.3 %
RED BLOOD CELLS URINE: 7 /HPF
SODIUM SERPL-SCNC: 140 MMOL/L
SPECIFIC GRAVITY URINE: 1.03
SQUAMOUS EPITHELIAL CELLS: 4 /HPF
T4 FREE SERPL-MCNC: 1.1 NG/DL
TRIGL SERPL-MCNC: 73 MG/DL
TSH SERPL-ACNC: 1.97 UIU/ML
UROBILINOGEN URINE: ABNORMAL
WBC # FLD AUTO: 5.03 K/UL
WHITE BLOOD CELLS URINE: 3 /HPF

## 2019-10-20 NOTE — HISTORY OF PRESENT ILLNESS
[de-identified] : Her diet is fair.  She isn't exercising.  No chest pain or dyspnea.\par \par She sees Dr. Stoddard every six months.  \par \par She saw an endocrinologist for the bones- no medication advised.  \par \par She had RLQ pain and she went to to an urgent care facility and was sent to the ER to rule out appendicitis.  She had a CT A/P which did not show significant pathology.  She has seen her gastroenterologist, Dr. Vicente and she was referred to a surgeon, Dr. Red.  Symptoms are intermittent now.  She is using Miralax.  [FreeTextEntry1] : The patient is here for a routine visit.

## 2019-10-20 NOTE — HEALTH RISK ASSESSMENT
[No] : No [No falls in past year] : Patient reported no falls in the past year [Fully functional (bathing, dressing, toileting, transferring, walking, feeding)] : Fully functional (bathing, dressing, toileting, transferring, walking, feeding) [0] : 2) Feeling down, depressed, or hopeless: Not at all (0) [Fully functional (using the telephone, shopping, preparing meals, housekeeping, doing laundry, using] : Fully functional and needs no help or supervision to perform IADLs (using the telephone, shopping, preparing meals, housekeeping, doing laundry, using transportation, managing medications and managing finances) [[id number]] : 0 [Reports changes in hearing] : Reports no changes in hearing [] : No [Reports changes in dental health] : Reports no changes in dental health [Reports changes in vision] : Reports no changes in vision

## 2019-10-20 NOTE — PHYSICAL EXAM
[No Acute Distress] : no acute distress [Well Nourished] : well nourished [Well Developed] : well developed [Well-Appearing] : well-appearing [Normal Sclera/Conjunctiva] : normal sclera/conjunctiva [PERRL] : pupils equal round and reactive to light [EOMI] : extraocular movements intact [Normal Outer Ear/Nose] : the outer ears and nose were normal in appearance [Normal Oropharynx] : the oropharynx was normal [No JVD] : no jugular venous distention [No Lymphadenopathy] : no lymphadenopathy [Supple] : supple [Thyroid Normal, No Nodules] : the thyroid was normal and there were no nodules present [No Respiratory Distress] : no respiratory distress  [No Accessory Muscle Use] : no accessory muscle use [Clear to Auscultation] : lungs were clear to auscultation bilaterally [Normal Rate] : normal rate  [Regular Rhythm] : with a regular rhythm [Normal S1, S2] : normal S1 and S2 [No Murmur] : no murmur heard [No Carotid Bruits] : no carotid bruits [No Abdominal Bruit] : a ~M bruit was not heard ~T in the abdomen [No Varicosities] : no varicosities [Pedal Pulses Present] : the pedal pulses are present [No Edema] : there was no peripheral edema [No Palpable Aorta] : no palpable aorta [No Nipple Discharge] : no nipple discharge [No Extremity Clubbing/Cyanosis] : no extremity clubbing/cyanosis [Normal Appearance] : normal in appearance [Soft] : abdomen soft [No Axillary Lymphadenopathy] : no axillary lymphadenopathy [Non-distended] : non-distended [No Masses] : no abdominal mass palpated [Normal Posterior Cervical Nodes] : no posterior cervical lymphadenopathy [Normal Bowel Sounds] : normal bowel sounds [No HSM] : no HSM [No CVA Tenderness] : no CVA  tenderness [No Spinal Tenderness] : no spinal tenderness [Normal Anterior Cervical Nodes] : no anterior cervical lymphadenopathy [Grossly Normal Strength/Tone] : grossly normal strength/tone [No Rash] : no rash [No Joint Swelling] : no joint swelling [Deep Tendon Reflexes (DTR)] : deep tendon reflexes were 2+ and symmetric [Coordination Grossly Intact] : coordination grossly intact [No Focal Deficits] : no focal deficits [Normal Gait] : normal gait [Normal Affect] : the affect was normal [Normal Insight/Judgement] : insight and judgment were intact [de-identified] : RLG mild-moderate tenderness.  No rebound or guarding

## 2019-10-20 NOTE — ASSESSMENT
[FreeTextEntry1] : The cause of the RLQ pain is not clear.  IT is now intermittent and seems somewhat better.  She has had blood tests, CT A/P, GI and surgical evaluation.  The pain might be muscular.  Monitor for now and she should return PRN.  Will recheck a CBC and metabolic since she still has pain.  \par \par Discussed diet and exercise.  Check lipids on Atorvastatin.\par \par She has chronic back pain which was discussed.\par \par Gyn UTD.  Mammogram 1/19.  DEXA about 4/19.  \par \par Colonoscopy and EGD 8/16.  \par \par S/p Prevnar, Pneumovax, flu vaccine, Shingrix.

## 2020-03-30 ENCOUNTER — APPOINTMENT (OUTPATIENT)
Dept: INTERNAL MEDICINE | Facility: CLINIC | Age: 75
End: 2020-03-30

## 2020-04-15 ENCOUNTER — APPOINTMENT (OUTPATIENT)
Dept: INTERNAL MEDICINE | Facility: CLINIC | Age: 75
End: 2020-04-15
Payer: MEDICARE

## 2020-04-15 PROCEDURE — 99441: CPT

## 2020-04-16 ENCOUNTER — TRANSCRIPTION ENCOUNTER (OUTPATIENT)
Age: 75
End: 2020-04-16

## 2020-07-23 ENCOUNTER — APPOINTMENT (OUTPATIENT)
Dept: INTERNAL MEDICINE | Facility: CLINIC | Age: 75
End: 2020-07-23
Payer: MEDICARE

## 2020-07-23 ENCOUNTER — NON-APPOINTMENT (OUTPATIENT)
Age: 75
End: 2020-07-23

## 2020-07-23 VITALS
HEART RATE: 80 BPM | HEIGHT: 64 IN | BODY MASS INDEX: 21 KG/M2 | WEIGHT: 123 LBS | OXYGEN SATURATION: 97 % | TEMPERATURE: 98.2 F

## 2020-07-23 VITALS — DIASTOLIC BLOOD PRESSURE: 60 MMHG | SYSTOLIC BLOOD PRESSURE: 100 MMHG

## 2020-07-23 DIAGNOSIS — R25.1 TREMOR, UNSPECIFIED: ICD-10-CM

## 2020-07-23 PROCEDURE — 99214 OFFICE O/P EST MOD 30 MIN: CPT | Mod: 25

## 2020-07-23 PROCEDURE — 93000 ELECTROCARDIOGRAM COMPLETE: CPT

## 2020-07-23 RX ORDER — PRASUGREL HYDROCHLORIDE 10 MG/1
10 TABLET, COATED ORAL DAILY
Qty: 90 | Refills: 1 | Status: DISCONTINUED | COMMUNITY
Start: 2017-08-07 | End: 2020-07-23

## 2020-07-23 RX ORDER — NITROFURANTOIN (MONOHYDRATE/MACROCRYSTALS) 25; 75 MG/1; MG/1
100 CAPSULE ORAL TWICE DAILY
Qty: 10 | Refills: 0 | Status: DISCONTINUED | COMMUNITY
Start: 2020-06-18 | End: 2020-07-23

## 2020-07-23 RX ORDER — MIRABEGRON 25 MG/1
25 TABLET, FILM COATED, EXTENDED RELEASE ORAL
Qty: 60 | Refills: 0 | Status: DISCONTINUED | COMMUNITY
Start: 2018-05-10 | End: 2020-07-23

## 2020-07-23 NOTE — PHYSICAL EXAM
[No Acute Distress] : no acute distress [Well Nourished] : well nourished [Well Developed] : well developed [PERRL] : pupils equal round and reactive to light [Normal Sclera/Conjunctiva] : normal sclera/conjunctiva [Well-Appearing] : well-appearing [Normal Outer Ear/Nose] : the outer ears and nose were normal in appearance [Normal Oropharynx] : the oropharynx was normal [EOMI] : extraocular movements intact [No Lymphadenopathy] : no lymphadenopathy [No JVD] : no jugular venous distention [Supple] : supple [No Respiratory Distress] : no respiratory distress  [Thyroid Normal, No Nodules] : the thyroid was normal and there were no nodules present [Clear to Auscultation] : lungs were clear to auscultation bilaterally [No Accessory Muscle Use] : no accessory muscle use [Regular Rhythm] : with a regular rhythm [Normal Rate] : normal rate  [No Carotid Bruits] : no carotid bruits [Normal S1, S2] : normal S1 and S2 [No Murmur] : no murmur heard [Pedal Pulses Present] : the pedal pulses are present [No Varicosities] : no varicosities [No Abdominal Bruit] : a ~M bruit was not heard ~T in the abdomen [No Extremity Clubbing/Cyanosis] : no extremity clubbing/cyanosis [No Edema] : there was no peripheral edema [No Palpable Aorta] : no palpable aorta [Non Tender] : non-tender [Soft] : abdomen soft [Non-distended] : non-distended [Normal Bowel Sounds] : normal bowel sounds [No HSM] : no HSM [No Masses] : no abdominal mass palpated [Normal Anterior Cervical Nodes] : no anterior cervical lymphadenopathy [No CVA Tenderness] : no CVA  tenderness [Normal Posterior Cervical Nodes] : no posterior cervical lymphadenopathy [No Spinal Tenderness] : no spinal tenderness [No Joint Swelling] : no joint swelling [Grossly Normal Strength/Tone] : grossly normal strength/tone [No Focal Deficits] : no focal deficits [Coordination Grossly Intact] : coordination grossly intact [No Rash] : no rash [Deep Tendon Reflexes (DTR)] : deep tendon reflexes were 2+ and symmetric [Normal Gait] : normal gait [Normal Affect] : the affect was normal [Normal Insight/Judgement] : insight and judgment were intact

## 2020-07-23 NOTE — HISTORY OF PRESENT ILLNESS
[FreeTextEntry1] : cataract surgery [FreeTextEntry2] : 8/4/20 [FreeTextEntry4] : The patient is here for a medical clearance prior to planned left eye cataract surgery on 8/4/20 and left eye on 8/20/20.  \par \par She feels well except for a post nasal drip which is chronic.  \par \par She had previously had exertional dyspnea and she had a coronary angiogram in 4/20 which showed patent stents and no occlusive CAD.  She feels better now with no exertional chest pain or dyspnea.\par \par She recently had a cortisone shot in her hip.\par \par She saw Dr. Juarez Vicente in routine follow-up.  No new GI symptoms.  \par \par She saw Dr. Kirk Haider for a tremor.   [FreeTextEntry3] : Dr. Cory Stroud

## 2020-07-23 NOTE — ASSESSMENT
[Patient Optimized for Surgery] : Patient optimized for surgery [FreeTextEntry4] : The patient is at low medical risk for the planned procedure.  She has no symptoms now to suggest ischemic ASHD.  She should continue the aspirin up through the surgery.  The BP is good.\par \par See the clearance form for more information.  \par \par She is going for COVID-19 testing prior to the procedure.

## 2020-08-01 ENCOUNTER — APPOINTMENT (OUTPATIENT)
Dept: DISASTER EMERGENCY | Facility: CLINIC | Age: 75
End: 2020-08-01

## 2020-08-17 ENCOUNTER — APPOINTMENT (OUTPATIENT)
Dept: DISASTER EMERGENCY | Facility: CLINIC | Age: 75
End: 2020-08-17

## 2020-10-13 ENCOUNTER — APPOINTMENT (OUTPATIENT)
Dept: INTERNAL MEDICINE | Facility: CLINIC | Age: 75
End: 2020-10-13
Payer: MEDICARE

## 2020-10-13 DIAGNOSIS — Z11.59 ENCOUNTER FOR SCREENING FOR OTHER VIRAL DISEASES: ICD-10-CM

## 2020-10-13 PROCEDURE — 36415 COLL VENOUS BLD VENIPUNCTURE: CPT

## 2020-10-14 LAB
25(OH)D3 SERPL-MCNC: 49.6 NG/ML
ALBUMIN SERPL ELPH-MCNC: 4 G/DL
ALP BLD-CCNC: 87 U/L
ALT SERPL-CCNC: 21 U/L
ANION GAP SERPL CALC-SCNC: 12 MMOL/L
APPEARANCE: ABNORMAL
AST SERPL-CCNC: 15 U/L
BACTERIA: NEGATIVE
BASOPHILS # BLD AUTO: 0.05 K/UL
BASOPHILS NFR BLD AUTO: 0.6 %
BILIRUB SERPL-MCNC: 0.4 MG/DL
BILIRUBIN URINE: NEGATIVE
BLOOD URINE: NEGATIVE
BUN SERPL-MCNC: 19 MG/DL
CALCIUM OXALATE CRYSTALS: ABNORMAL
CALCIUM SERPL-MCNC: 9.2 MG/DL
CHLORIDE SERPL-SCNC: 104 MMOL/L
CHOLEST SERPL-MCNC: 169 MG/DL
CHOLEST/HDLC SERPL: 2.2 RATIO
CO2 SERPL-SCNC: 27 MMOL/L
COLOR: YELLOW
CREAT SERPL-MCNC: 0.76 MG/DL
EOSINOPHIL # BLD AUTO: 0.08 K/UL
EOSINOPHIL NFR BLD AUTO: 1 %
ESTIMATED AVERAGE GLUCOSE: 114 MG/DL
FERRITIN SERPL-MCNC: 51 NG/ML
GLUCOSE QUALITATIVE U: NEGATIVE
GLUCOSE SERPL-MCNC: 94 MG/DL
HBA1C MFR BLD HPLC: 5.6 %
HCT VFR BLD CALC: 37.3 %
HDLC SERPL-MCNC: 77 MG/DL
HGB BLD-MCNC: 11.6 G/DL
HYALINE CASTS: 0 /LPF
IMM GRANULOCYTES NFR BLD AUTO: 0.4 %
IRON SATN MFR SERPL: 30 %
IRON SERPL-MCNC: 82 UG/DL
KETONES URINE: NEGATIVE
LDLC SERPL CALC-MCNC: 82 MG/DL
LEUKOCYTE ESTERASE URINE: ABNORMAL
LYMPHOCYTES # BLD AUTO: 1.42 K/UL
LYMPHOCYTES NFR BLD AUTO: 17.8 %
MAN DIFF?: NORMAL
MCHC RBC-ENTMCNC: 28.1 PG
MCHC RBC-ENTMCNC: 31.1 GM/DL
MCV RBC AUTO: 90.3 FL
MICROSCOPIC-UA: NORMAL
MONOCYTES # BLD AUTO: 0.51 K/UL
MONOCYTES NFR BLD AUTO: 6.4 %
NEUTROPHILS # BLD AUTO: 5.9 K/UL
NEUTROPHILS NFR BLD AUTO: 73.8 %
NITRITE URINE: NEGATIVE
PH URINE: 6
PLATELET # BLD AUTO: 227 K/UL
POTASSIUM SERPL-SCNC: 3.7 MMOL/L
PROT SERPL-MCNC: 6.2 G/DL
PROTEIN URINE: NORMAL
RBC # BLD: 4.13 M/UL
RBC # FLD: 14.6 %
RED BLOOD CELLS URINE: 4 /HPF
SARS-COV-2 IGG SERPL IA-ACNC: 0.1 INDEX
SARS-COV-2 IGG SERPL QL IA: NEGATIVE
SODIUM SERPL-SCNC: 144 MMOL/L
SPECIFIC GRAVITY URINE: 1.02
SQUAMOUS EPITHELIAL CELLS: 5 /HPF
T4 FREE SERPL-MCNC: 1 NG/DL
TIBC SERPL-MCNC: 277 UG/DL
TRIGL SERPL-MCNC: 47 MG/DL
TSH SERPL-ACNC: 1.96 UIU/ML
UIBC SERPL-MCNC: 195 UG/DL
URINE COMMENTS: NORMAL
UROBILINOGEN URINE: NORMAL
WBC # FLD AUTO: 7.99 K/UL
WHITE BLOOD CELLS URINE: 5 /HPF

## 2020-10-20 ENCOUNTER — APPOINTMENT (OUTPATIENT)
Dept: INTERNAL MEDICINE | Facility: CLINIC | Age: 75
End: 2020-10-20
Payer: MEDICARE

## 2020-10-20 VITALS
HEART RATE: 71 BPM | TEMPERATURE: 97.2 F | BODY MASS INDEX: 21.17 KG/M2 | OXYGEN SATURATION: 99 % | HEIGHT: 64 IN | WEIGHT: 124 LBS

## 2020-10-20 VITALS — DIASTOLIC BLOOD PRESSURE: 60 MMHG | SYSTOLIC BLOOD PRESSURE: 100 MMHG

## 2020-10-20 PROCEDURE — G0444 DEPRESSION SCREEN ANNUAL: CPT | Mod: 59

## 2020-10-20 PROCEDURE — G0439: CPT

## 2020-10-20 NOTE — ASSESSMENT
[FreeTextEntry1] : She has a number of chronic symptoms and she was counseled.  No new concerning symptoms.  She wonders if the medications might be responsible for some of her symptoms.  We agreed to lower the Gabapentin from TID to BID.  Monitor.\par \par Lipids good.  Other blood tests are fine and were reviewed with her.  Discussed diet and exercise.\par \par She sees her endocrinologist and had a recent DEXA.\par \par She sees her gyn.  Mammogram and breast U/S UTD.\par \par Colonoscopy 8/16 and she is going for another soon.\par \par She sees her cardiologist.\par \par She sees a dermatologist and ophthalmologist.  \par \par H/H WNL (previous anemia.)\par \par COVID-19 antibody is negative.  \par \par S/p flu vaccine, Prevnar, Pneumovax, Shingrix.

## 2020-10-20 NOTE — HISTORY OF PRESENT ILLNESS
[de-identified] : She has similar chronic complaints- headache, eye pain, fatigue, "cloudiness".  Her diet is healthy.\par \par She had the cataract surgery and she is returning tomorrow because of eye pain.\par \par She has seen Dr. Haider for headaches and evaluation was unchanged.\par \par She sees Dr. Stoddard.  Recent cardiac catheterization was patent.  \par \par She has subjective dizziness and off balance feeling.   [FreeTextEntry1] : The patient is here for a routine visit.

## 2020-10-20 NOTE — PHYSICAL EXAM
[No Acute Distress] : no acute distress [Well Nourished] : well nourished [Well Developed] : well developed [Well-Appearing] : well-appearing [PERRL] : pupils equal round and reactive to light [Normal Sclera/Conjunctiva] : normal sclera/conjunctiva [EOMI] : extraocular movements intact [Normal Outer Ear/Nose] : the outer ears and nose were normal in appearance [No JVD] : no jugular venous distention [Normal Oropharynx] : the oropharynx was normal [No Lymphadenopathy] : no lymphadenopathy [Supple] : supple [Thyroid Normal, No Nodules] : the thyroid was normal and there were no nodules present [No Respiratory Distress] : no respiratory distress  [No Accessory Muscle Use] : no accessory muscle use [Clear to Auscultation] : lungs were clear to auscultation bilaterally [Normal Rate] : normal rate  [Regular Rhythm] : with a regular rhythm [Normal S1, S2] : normal S1 and S2 [No Murmur] : no murmur heard [No Carotid Bruits] : no carotid bruits [No Abdominal Bruit] : a ~M bruit was not heard ~T in the abdomen [No Varicosities] : no varicosities [No Edema] : there was no peripheral edema [Pedal Pulses Present] : the pedal pulses are present [No Extremity Clubbing/Cyanosis] : no extremity clubbing/cyanosis [No Palpable Aorta] : no palpable aorta [Normal Appearance] : normal in appearance [No Nipple Discharge] : no nipple discharge [No Axillary Lymphadenopathy] : no axillary lymphadenopathy [Non-distended] : non-distended [Soft] : abdomen soft [Non Tender] : non-tender [No HSM] : no HSM [No Masses] : no abdominal mass palpated [Normal Posterior Cervical Nodes] : no posterior cervical lymphadenopathy [Normal Bowel Sounds] : normal bowel sounds [Normal Anterior Cervical Nodes] : no anterior cervical lymphadenopathy [No CVA Tenderness] : no CVA  tenderness [No Joint Swelling] : no joint swelling [No Spinal Tenderness] : no spinal tenderness [No Rash] : no rash [Grossly Normal Strength/Tone] : grossly normal strength/tone [Coordination Grossly Intact] : coordination grossly intact [Normal Gait] : normal gait [No Focal Deficits] : no focal deficits [Normal Affect] : the affect was normal [Normal Insight/Judgement] : insight and judgment were intact

## 2020-10-20 NOTE — HEALTH RISK ASSESSMENT
[No] : No [Fully functional (bathing, dressing, toileting, transferring, walking, feeding)] : Fully functional (bathing, dressing, toileting, transferring, walking, feeding) [Fully functional (using the telephone, shopping, preparing meals, housekeeping, doing laundry, using] : Fully functional and needs no help or supervision to perform IADLs (using the telephone, shopping, preparing meals, housekeeping, doing laundry, using transportation, managing medications and managing finances) [] : No [Reports changes in vision] : Reports no changes in vision [Reports changes in hearing] : Reports no changes in hearing [Reports changes in dental health] : Reports no changes in dental health

## 2020-10-30 ENCOUNTER — RX RENEWAL (OUTPATIENT)
Age: 75
End: 2020-10-30

## 2020-11-28 ENCOUNTER — TRANSCRIPTION ENCOUNTER (OUTPATIENT)
Age: 75
End: 2020-11-28

## 2021-01-07 ENCOUNTER — RX RENEWAL (OUTPATIENT)
Age: 76
End: 2021-01-07

## 2021-02-03 ENCOUNTER — RX RENEWAL (OUTPATIENT)
Age: 76
End: 2021-02-03

## 2021-03-26 ENCOUNTER — NON-APPOINTMENT (OUTPATIENT)
Age: 76
End: 2021-03-26

## 2021-04-01 ENCOUNTER — APPOINTMENT (OUTPATIENT)
Dept: INTERNAL MEDICINE | Facility: CLINIC | Age: 76
End: 2021-04-01
Payer: MEDICARE

## 2021-04-01 VITALS — HEART RATE: 71 BPM | OXYGEN SATURATION: 99 % | TEMPERATURE: 97.4 F

## 2021-04-01 PROCEDURE — 99213 OFFICE O/P EST LOW 20 MIN: CPT

## 2021-04-02 VITALS — DIASTOLIC BLOOD PRESSURE: 70 MMHG | SYSTOLIC BLOOD PRESSURE: 100 MMHG

## 2021-04-02 NOTE — ASSESSMENT
[FreeTextEntry1] : The patient has a URI, and also sinusitis symptoms which she has had before.  Will start Augmentin BID. Advise rest, fluids, Tylenol PRN.  NOte she has had the COVID-19 vaccine and she is unlikely to have COVID-19 now.  \par \par Note she is scheduled for a THR at [facility] on 4/7/21.  She will start the antibiotics now. If she feels better in 4-5 days she can proceed with surgery.  If she still has symptoms she will need to postpone the surgery.\par \par She is going for a presurgical COVID-19 test.\par \par She was reminded to avoid NSAIDS which she doesn't take anyway.  \par \par

## 2021-04-02 NOTE — HISTORY OF PRESENT ILLNESS
[FreeTextEntry8] : The patient has symptoms for four days- nasal congestion, sinus symptoms, a sense of a lump in her throat, head pain and pressure, pain to swallow, ear popping.  No fever, chest symptoms, dyspnea, cough, ear pain.  She feels about the same.  No sick contacts.

## 2021-04-11 ENCOUNTER — EMERGENCY (EMERGENCY)
Facility: HOSPITAL | Age: 76
LOS: 1 days | Discharge: HOME | End: 2021-04-11
Attending: EMERGENCY MEDICINE
Payer: MEDICARE

## 2021-04-11 VITALS
OXYGEN SATURATION: 98 % | SYSTOLIC BLOOD PRESSURE: 146 MMHG | DIASTOLIC BLOOD PRESSURE: 79 MMHG | RESPIRATION RATE: 18 BRPM | TEMPERATURE: 98 F | HEART RATE: 83 BPM

## 2021-04-11 VITALS
HEART RATE: 84 BPM | HEIGHT: 63 IN | SYSTOLIC BLOOD PRESSURE: 121 MMHG | RESPIRATION RATE: 19 BRPM | TEMPERATURE: 98 F | OXYGEN SATURATION: 97 % | DIASTOLIC BLOOD PRESSURE: 73 MMHG

## 2021-04-11 DIAGNOSIS — Z96.651 PRESENCE OF RIGHT ARTIFICIAL KNEE JOINT: Chronic | ICD-10-CM

## 2021-04-11 LAB
ALBUMIN SERPL ELPH-MCNC: 3.6 G/DL — SIGNIFICANT CHANGE UP (ref 3.3–5)
ALP SERPL-CCNC: 88 U/L — SIGNIFICANT CHANGE UP (ref 40–120)
ALT FLD-CCNC: 23 U/L — SIGNIFICANT CHANGE UP (ref 10–45)
ANION GAP SERPL CALC-SCNC: 13 MMOL/L — SIGNIFICANT CHANGE UP (ref 5–17)
AST SERPL-CCNC: 24 U/L — SIGNIFICANT CHANGE UP (ref 10–40)
BASOPHILS # BLD AUTO: 0.02 K/UL — SIGNIFICANT CHANGE UP (ref 0–0.2)
BASOPHILS NFR BLD AUTO: 0.2 % — SIGNIFICANT CHANGE UP (ref 0–2)
BILIRUB SERPL-MCNC: 0.4 MG/DL — SIGNIFICANT CHANGE UP (ref 0.2–1.2)
BUN SERPL-MCNC: 11 MG/DL — SIGNIFICANT CHANGE UP (ref 7–23)
CALCIUM SERPL-MCNC: 9.4 MG/DL — SIGNIFICANT CHANGE UP (ref 8.4–10.5)
CHLORIDE SERPL-SCNC: 99 MMOL/L — SIGNIFICANT CHANGE UP (ref 96–108)
CO2 SERPL-SCNC: 26 MMOL/L — SIGNIFICANT CHANGE UP (ref 22–31)
CREAT SERPL-MCNC: 0.5 MG/DL — SIGNIFICANT CHANGE UP (ref 0.5–1.3)
EOSINOPHIL # BLD AUTO: 0.04 K/UL — SIGNIFICANT CHANGE UP (ref 0–0.5)
EOSINOPHIL NFR BLD AUTO: 0.4 % — SIGNIFICANT CHANGE UP (ref 0–6)
GLUCOSE SERPL-MCNC: 143 MG/DL — HIGH (ref 70–99)
HCT VFR BLD CALC: 30.4 % — LOW (ref 34.5–45)
HGB BLD-MCNC: 9.9 G/DL — LOW (ref 11.5–15.5)
IMM GRANULOCYTES NFR BLD AUTO: 0.3 % — SIGNIFICANT CHANGE UP (ref 0–1.5)
LYMPHOCYTES # BLD AUTO: 1 K/UL — SIGNIFICANT CHANGE UP (ref 1–3.3)
LYMPHOCYTES # BLD AUTO: 10.2 % — LOW (ref 13–44)
MCHC RBC-ENTMCNC: 27.9 PG — SIGNIFICANT CHANGE UP (ref 27–34)
MCHC RBC-ENTMCNC: 32.6 GM/DL — SIGNIFICANT CHANGE UP (ref 32–36)
MCV RBC AUTO: 85.6 FL — SIGNIFICANT CHANGE UP (ref 80–100)
MONOCYTES # BLD AUTO: 0.62 K/UL — SIGNIFICANT CHANGE UP (ref 0–0.9)
MONOCYTES NFR BLD AUTO: 6.3 % — SIGNIFICANT CHANGE UP (ref 2–14)
NEUTROPHILS # BLD AUTO: 8.07 K/UL — HIGH (ref 1.8–7.4)
NEUTROPHILS NFR BLD AUTO: 82.6 % — HIGH (ref 43–77)
NRBC # BLD: 0 /100 WBCS — SIGNIFICANT CHANGE UP (ref 0–0)
PLATELET # BLD AUTO: 246 K/UL — SIGNIFICANT CHANGE UP (ref 150–400)
POTASSIUM SERPL-MCNC: 3.3 MMOL/L — LOW (ref 3.5–5.3)
POTASSIUM SERPL-SCNC: 3.3 MMOL/L — LOW (ref 3.5–5.3)
PROT SERPL-MCNC: 6.5 G/DL — SIGNIFICANT CHANGE UP (ref 6–8.3)
RBC # BLD: 3.55 M/UL — LOW (ref 3.8–5.2)
RBC # FLD: 13.7 % — SIGNIFICANT CHANGE UP (ref 10.3–14.5)
SODIUM SERPL-SCNC: 138 MMOL/L — SIGNIFICANT CHANGE UP (ref 135–145)
WBC # BLD: 9.78 K/UL — SIGNIFICANT CHANGE UP (ref 3.8–10.5)
WBC # FLD AUTO: 9.78 K/UL — SIGNIFICANT CHANGE UP (ref 3.8–10.5)

## 2021-04-11 PROCEDURE — 85025 COMPLETE CBC W/AUTO DIFF WBC: CPT

## 2021-04-11 PROCEDURE — 96374 THER/PROPH/DIAG INJ IV PUSH: CPT

## 2021-04-11 PROCEDURE — 99284 EMERGENCY DEPT VISIT MOD MDM: CPT | Mod: 25

## 2021-04-11 PROCEDURE — 93005 ELECTROCARDIOGRAM TRACING: CPT

## 2021-04-11 PROCEDURE — 96375 TX/PRO/DX INJ NEW DRUG ADDON: CPT

## 2021-04-11 PROCEDURE — 99285 EMERGENCY DEPT VISIT HI MDM: CPT | Mod: CS,GC

## 2021-04-11 PROCEDURE — 93010 ELECTROCARDIOGRAM REPORT: CPT | Mod: GC

## 2021-04-11 PROCEDURE — 80053 COMPREHEN METABOLIC PANEL: CPT

## 2021-04-11 RX ORDER — POTASSIUM CHLORIDE 20 MEQ
10 PACKET (EA) ORAL ONCE
Refills: 0 | Status: COMPLETED | OUTPATIENT
Start: 2021-04-11 | End: 2021-04-11

## 2021-04-11 RX ORDER — PANTOPRAZOLE SODIUM 20 MG/1
40 TABLET, DELAYED RELEASE ORAL ONCE
Refills: 0 | Status: COMPLETED | OUTPATIENT
Start: 2021-04-11 | End: 2021-04-11

## 2021-04-11 RX ORDER — MECLIZINE HCL 12.5 MG
25 TABLET ORAL ONCE
Refills: 0 | Status: COMPLETED | OUTPATIENT
Start: 2021-04-11 | End: 2021-04-11

## 2021-04-11 RX ORDER — ONDANSETRON 8 MG/1
4 TABLET, FILM COATED ORAL ONCE
Refills: 0 | Status: COMPLETED | OUTPATIENT
Start: 2021-04-11 | End: 2021-04-11

## 2021-04-11 RX ORDER — SODIUM CHLORIDE 9 MG/ML
1000 INJECTION INTRAMUSCULAR; INTRAVENOUS; SUBCUTANEOUS ONCE
Refills: 0 | Status: COMPLETED | OUTPATIENT
Start: 2021-04-11 | End: 2021-04-11

## 2021-04-11 RX ORDER — SODIUM CHLORIDE 9 MG/ML
1000 INJECTION, SOLUTION INTRAVENOUS ONCE
Refills: 0 | Status: DISCONTINUED | OUTPATIENT
Start: 2021-04-11 | End: 2021-04-11

## 2021-04-11 RX ORDER — SODIUM CHLORIDE 9 MG/ML
1000 INJECTION, SOLUTION INTRAVENOUS ONCE
Refills: 0 | Status: COMPLETED | OUTPATIENT
Start: 2021-04-11 | End: 2021-04-11

## 2021-04-11 RX ORDER — FAMOTIDINE 10 MG/ML
20 INJECTION INTRAVENOUS ONCE
Refills: 0 | Status: COMPLETED | OUTPATIENT
Start: 2021-04-11 | End: 2021-04-11

## 2021-04-11 RX ADMIN — SODIUM CHLORIDE 1000 MILLILITER(S): 9 INJECTION INTRAMUSCULAR; INTRAVENOUS; SUBCUTANEOUS at 19:39

## 2021-04-11 RX ADMIN — ONDANSETRON 4 MILLIGRAM(S): 8 TABLET, FILM COATED ORAL at 18:18

## 2021-04-11 RX ADMIN — Medication 100 MILLIEQUIVALENT(S): at 19:39

## 2021-04-11 RX ADMIN — PANTOPRAZOLE SODIUM 40 MILLIGRAM(S): 20 TABLET, DELAYED RELEASE ORAL at 21:11

## 2021-04-11 RX ADMIN — FAMOTIDINE 20 MILLIGRAM(S): 10 INJECTION INTRAVENOUS at 18:18

## 2021-04-11 RX ADMIN — Medication 25 MILLIGRAM(S): at 19:39

## 2021-04-11 RX ADMIN — SODIUM CHLORIDE 1000 MILLILITER(S): 9 INJECTION, SOLUTION INTRAVENOUS at 18:18

## 2021-04-11 NOTE — ED PROVIDER NOTE - ABDOMINAL TENDER
**ATTENDING ADDENDUM (Dr. Real Cooper): POSITIVE minimal tenderness with palpation over the epigastric area. NO distension./epigastric

## 2021-04-11 NOTE — ED ADULT NURSE NOTE - NSIMPLEMENTINTERV_GEN_ALL_ED
Implemented All Universal Safety Interventions:  [place] to call system. Call bell, personal items and telephone within reach. Instruct patient to call for assistance. Room bathroom lighting operational. Non-slip footwear when patient is off stretcher. Physically safe environment: no spills, clutter or unnecessary equipment. Stretcher in lowest position, wheels locked, appropriate side rails in place.

## 2021-04-11 NOTE — ED PROVIDER NOTE - AGGRAVATING FACTORS
**ATTENDING ADDENDUM (Dr. Real Cooper): POSITIVE recent surgery and general anesthesia. NO movement-associated, pleuritic, reproducible, positional, or exertional component to the symptoms.

## 2021-04-11 NOTE — ED PROVIDER NOTE - NSFOLLOWUPINSTRUCTIONS_ED_ALL_ED_FT
Return to the ER if you have new chest pain, shortness of breath, fevers, inability to eat or drink, or worsening of symptoms that brought you to the emergency room today.    Take any home medications as previously directed.    Take zofran as needed for nausea.    Follow up with your GI doctor as soon as possible; if you need a new GI doctor call the above phone number.

## 2021-04-11 NOTE — ED PROVIDER NOTE - PHYSICAL EXAMINATION
**ATTENDING ADDENDUM (Dr. Real Cooper): I have reviewed and substantially contributed to the elements of the PE as documented above. I have directly performed an examination of this patient in conjunction with the other members (EM resident/PA/NP) of the patient care team. I have personally reviewed the patient's vital signs at the time of the patient's initial presentation to the ED and repeatedly throughout the ED course. **ATTENDING ADDENDUM (Dr. Real Cooper): I have reviewed and substantially contributed to the elements of the PE as documented above. I have directly performed an examination of this patient in conjunction with the other members (IM resident/PA/NP) of the patient care team. I have personally reviewed the patient's vital signs at the time of the patient's initial presentation to the ED and repeatedly throughout the ED course.

## 2021-04-11 NOTE — ED PROVIDER NOTE - EYES, MLM
Clear bilaterally, pupils equal, round and reactive to light. **ATTENDING ADDENDUM (Dr. Real Cooper): Extraocular muscle movements intact. Clear corneas bilaterally, pupils equal and round. NO nystagmus.

## 2021-04-11 NOTE — ED PROVIDER NOTE - CONTEXT
**ATTENDING ADDENDUM (Dr. Real Cooper): attempted oral intake; POSITIVE recent right hip replacement at [facility] (relatively uncomplicated course, required one unit of PRBC during procedure, POSITIVE 2-day stay)./when eating

## 2021-04-11 NOTE — ED PROVIDER NOTE - LAB INTERPRETATION
**ATTENDING ADDENDUM (Dr. Real Cooper): Labs reviewed and analyzed. Pertinent findings include: POSITIVE mild hyperglycemia noted. NO profound or severe electrolyte-metabolic-endocrine derangements, anemia, or leukocytosis.

## 2021-04-11 NOTE — ED PROVIDER NOTE - OBJECTIVE STATEMENT
75F hx of CAD s/p stents on ASA, depression, HLD, p/w nausea and vomiting since Friday. Pt recently had a hip replacement on Wednesday and required 1U PRBC during post-op course. Pt states that since she came home on Friday she has had episodes of nausea and non-bloody, non-bilious vomiting. She also endorses generalized weakness and "feeling like she's going to pass out." Pt also has not had an appetite since Friday and endorses decreased PO intake. Denies fevers, chills, SOB, chest pain, abdominal pain, diarrhea, and constipation. 75F hx of CAD s/p stents on ASA, depression, HLD, p/w nausea and vomiting since Friday. Pt recently had a hip replacement on Wednesday and required 1U PRBC during post-op course. Pt states that since she came home on Friday she has had episodes of nausea and non-bloody, non-bilious vomiting. She also endorses generalized weakness and "feeling like she's going to pass out." Pt also has not had an appetite since Friday and endorses decreased PO intake. Denies fevers, chills, SOB, chest pain, abdominal pain, diarrhea, and constipation.  **ATTENDING ADDENDUM (Dr. Real Cooper): I attest that I have directly and personally interviewed and examined this patient and elicited a comparable history of present illness and review of systems as documented, along with my IM resident on rotation in the ED. I attest that I have made significant contributions to the documentation where necessary and as noted in the EMR.

## 2021-04-11 NOTE — ED PROVIDER NOTE - PATIENT PORTAL LINK FT
You can access the FollowMyHealth Patient Portal offered by [facility] by registering at the following website: [email or web address]. By joining [employer]’s FollowMyHealth portal, you will also be able to view your health information using other applications (apps) compatible with our system.

## 2021-04-11 NOTE — ED PROVIDER NOTE - ENMT, MLM
Airway patent, Nasal mucosa clear. Mouth with normal mucosa. Throat has no vesicles, no oropharyngeal exudates and uvula is midline. **ATTENDING ADDENDUM (Dr. Real Cooper): NO droop.

## 2021-04-11 NOTE — ED PROVIDER NOTE - RADIATION
**ATTENDING ADDENDUM (Dr. Real Cooper): NO radiation to the back, jaw, neck, arms, or shoulders./abdominal region/abdomen

## 2021-04-11 NOTE — ED PROVIDER NOTE - RESPIRATORY, MLM
Breath sounds clear and equal bilaterally. Breath sounds clear and equal bilaterally. **ATTENDING ADDENDUM (Dr. Real Cooper): NO wheezing, rales, rhonchi, crackles, stridor, drooling, retractions, nasal flaring, or tripoding.

## 2021-04-11 NOTE — ED PROVIDER NOTE - MUSCULOSKELETAL MINIMAL EXAM
**ATTENDING ADDENDUM (Dr. Real Cooper): POSITIVE mild incisional soreness (wound is dressed). POSITIVE mild post-procedural pain in the right lower extremity./neck supple/motor intact

## 2021-04-11 NOTE — ED ADULT NURSE REASSESSMENT NOTE - NS ED NURSE REASSESS COMMENT FT1
1900: Report received from Amy RN.   1910: Pt. reporting dizziness, states she does not feel any better than when she comes in and states "I feel like if I stood up, I'd pass out." VSS. Fluids infusing. Pt. to receive IV potassium. MD Odonnell aware and at bedside to assess patient. WIll continue to reassess.

## 2021-04-11 NOTE — ED PROVIDER NOTE - CLINICAL SUMMARY MEDICAL DECISION MAKING FREE TEXT BOX
75F hx of CAD s/p stents on ASA recent hip replacement on Wednesday. Pt is endorsing nausea and vomiting since Friday. Will obtain cbc, cmp, EKG, famotidine 20 IV. 75F hx of CAD s/p stents on ASA recent hip replacement on Wednesday. Pt is endorsing nausea and vomiting since Friday. Will obtain cbc, cmp, EKG, famotidine 20 IV.  **ATTENDING MEDICAL DECISION MAKING/SYNTHESIS (Dr. Real Cooper): I have reviewed the Chief Concern(s), the HPI, the ROS, and have directly performed and confirmed the findings on the Physical Examination. I have reviewed the medical decision making with all providers, as applicable. The PROBLEM REPRESENTATION at this time is: 75-year-old woman with history of breast cancer on tamoxifen now presenting with concern for abdominal pain, nausea, and vomiting s/p recent hip replacement surgery. The MOST LIKELY DIAGNOSIS, and the LIST OF DIFFERENTIAL DIAGNOSES, includes (but is not limited to) the following: acute biliary disease (e.g. cholelithiasis, cholecystitis, or cholangitis), acute appendicitis, other surgical abdominal pathology e.g. abscess, diverticulitis/colitis, serious bacterial infection or sepsis/severe sepsis e.g. urinary tract infection, pyelonephritis, or equivalent, perforation, peritonitis, dehydration, electrolyte-metabolic-endocrine derangements, urologic cause e.g. obstructing ureteral stone, vascular cause e.g. AAA, dissection or equivalent, gastritis, gastroenteritis, musculoskeletal pain. The likelihood of each of these diagnoses has been appropriately considered in the context of this patient's presentation and my evaluation. PLAN: as described in EMR, including diagnostics, therapeutics and consultation as clinically warranted. I will continue to reevaluate the patient, including the results of all testing, and monitor response to therapy throughout the patient's course in the ED. 75F hx of CAD s/p stents on ASA recent hip replacement on Wednesday. Pt is endorsing nausea and vomiting since Friday. Will obtain cbc, cmp, EKG, famotidine 20 IV.  **ATTENDING MEDICAL DECISION MAKING/SYNTHESIS (Dr. Real Cooper): I have reviewed the Chief Concern(s), the HPI, the ROS, and have directly performed and confirmed the findings on the Physical Examination. I have reviewed the medical decision making with all providers, as applicable. The PROBLEM REPRESENTATION at this time is: 75-year-old woman with history of breast cancer on tamoxifen now presenting with concern for two days of abdominal pain, nausea, vomiting, generalized malaise and weakness s/p recent hip replacement surgery [facility] on Wednesday of this past week. Adherent with tramadol and ?gabapentin. NO diarrhea or constipation. Had bowel movements while in hospital. The MOST LIKELY DIAGNOSIS, and the LIST OF DIFFERENTIAL DIAGNOSES, includes (but is not limited to) the following: gastritis, ADR/SE from medications e.g. tramadol, general anesthesia, or equivalent, acute surgical abdominal process, e.g., acute biliary disease (e.g. cholelithiasis, cholecystitis, or cholangitis), acute appendicitis, other surgical abdominal pathology e.g. abscess, diverticulitis/colitis, serious bacterial infection or sepsis/severe sepsis e.g. urinary tract infection, pyelonephritis, or equivalent, perforation, peritonitis, dehydration, electrolyte-metabolic-endocrine derangements, urologic cause e.g. obstructing ureteral stone, vascular cause e.g. AAA, dissection or equivalent, gastroenteritis, musculoskeletal pain, CNS cause for symptoms s/p general anesthesia e.g. cerebrovascular accident, transient ischemic attack or equivalent (NO evidence), orthostatic symptoms (NO evidence). The likelihood of each of these diagnoses has been appropriately considered in the context of this patient's presentation and my evaluation. PLAN: as described in EMR, including diagnostics, therapeutics and consultation as clinically warranted. I will continue to reevaluate the patient, including the results of all testing, and monitor response to therapy throughout the patient's course in the ED.

## 2021-04-11 NOTE — ED PROVIDER NOTE - GENITOURINARY BLADDER
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
non-tender/non-distended

## 2021-04-11 NOTE — ED PROVIDER NOTE - CHPI ED SYMPTOMS NEG
**ATTENDING ADDENDUM (Dr. Real Cooper): NO hematemesis, melena, hematochezia, or coffee-ground emesis. NO syncope or near-syncope. NO crushing chest pain, palpitations, shortness of breath, or dyspnea on exertion.

## 2021-04-11 NOTE — ED PROVIDER NOTE - CHIEF COMPLAINT
The patient is a 75y Female complaining of  The patient is a 75y Female complaining of abdominal pain, nausea, and vomiting s/p recent hip replacement surgery.

## 2021-04-11 NOTE — ED ADULT TRIAGE NOTE - CHIEF COMPLAINT QUOTE
nausea with PO intake, s/p recent R hip THR, +required blood transfusion during hospital stay, last BM x 4 days ago

## 2021-04-11 NOTE — ED PROVIDER NOTE - ABDOMINAL EXAM
**ATTENDING ADDENDUM (Dr. Real Cooper): NO guarding, rebound, or rigidity. NO pulsatile or non-pulsatile masses. NO hernias. NO obvious hepatosplenomegaly./soft/tender.../nondistended/no organomegaly

## 2021-04-11 NOTE — ED PROVIDER NOTE - CARDIOVASCULAR NEGATIVE STATEMENT, MLM
no chest pain and no edema. no chest pain and no edema. **ATTENDING ADDENDUM (Dr. Real Cooper): POSITIVE history of hyperlipidemia.

## 2021-04-11 NOTE — ED ADULT NURSE NOTE - OBJECTIVE STATEMENT
75 year old female PMH/PSH of R hip replacement 4/7, anxiety, HLD, presents to the ED complaining of nausea/vomiting since last night. On arrival, patient is retching without vomiting. Patient endorses taking PO zofran prior to arrival without relief. Patient is A&Ox4, able to ambulate from wheelchair to bed without assistance while stating "i'm going to faint," "I need IV medication." VSS. Pt. denies fever/chills, chest pain, sob, diarrhea, dysuria, hematuria. Patient undressed and placed into gown, call bell in hand and side rails up with bed in lowest position for safety. blanket provided. Comfort and safety provided.

## 2021-04-11 NOTE — ED PROVIDER NOTE - CARE PLAN
Goal:	**ATTENDING ADDENDUM (Dr. Real Cooper): Goals of care include resolution of emergent/urgent symptoms and concerns, and restoration to baseline level of homeostasis.   Principal Discharge DX:	Epigastric abdominal pain  Goal:	**ATTENDING ADDENDUM (Dr. Real Cooper): Goals of care include resolution of emergent/urgent symptoms and concerns, and restoration to baseline level of homeostasis.  Secondary Diagnosis:	Nausea & vomiting

## 2021-04-11 NOTE — ED PROVIDER NOTE - ATTENDING CONTRIBUTION TO CARE
**ATTENDING ADDENDUM (Dr. Real Cooper): I attest that I have directly examined this patient and reviewed and formulated the diagnostic and therapeutic management plan in collaboration with the IM resident on rotation in the ED. Please see MDM note and remainder of EMR for findings from CC, HPI, ROS, and PE. (Anjali)

## 2021-04-11 NOTE — ED PROVIDER NOTE - PLAN OF CARE
**ATTENDING ADDENDUM (Dr. Real Cooper): Goals of care include resolution of emergent/urgent symptoms and concerns, and restoration to baseline level of homeostasis.

## 2021-04-11 NOTE — ED PROVIDER NOTE - NS ED ROS FT
**ATTENDING ADDENDUM (Dr. Real Cooper): During my interview with the patient, I have personally obtained and/or have directly verified the elements in the past medical/surgical history and other histories as noted earlier in the EMR, in conjunction with the other members (EM resident/PA/NP) of the patient care team. I have also personally obtained and/or have directly verified/reviewed the review of systems as documented below, in conjunction with the other members (EM resident/PA/NP) of the patient care team. **ATTENDING ADDENDUM (Dr. Real Cooper): During my interview with the patient, I have personally obtained and/or have directly verified the elements in the past medical/surgical history and other histories as noted earlier in the EMR, in conjunction with the other members (IM resident/PA/NP) of the patient care team. I have also personally obtained and/or have directly verified/reviewed the review of systems as documented below, in conjunction with the other members (IM resident/PA/NP) of the patient care team.

## 2021-04-11 NOTE — ED PROVIDER NOTE - PROGRESS NOTE DETAILS
**ATTENDING ADDENDUM (Dr. Real Cooper): Agree with goals/plan of ED care as described in EMR, including diagnostics, therapeutics and consultation as clinically warranted. Will continue to observe and monitor closely. Anticipatory guidance provided. Rec'd s/o from day team pending lab results, reassessment and PO chal. Hyopkalemia to be repleted on pt not anemic to the point of requiring transfusion. Pt states she still feels "distant" and like she is going to pass out despite zofran, IVF. Will give add'l IVF and meclizine as she reports dizziness on bilateral EOM. Remainder of neuro exam grossly intact apart from RLE weakness 2/2 pain. Pt tolerated PO challenge, states she is not feeling better but also does not want to stay in the ER for further testing. Advised her to f/u closely with her GI and PMD if sx do not improve or return to the ER if she is not tolerating PO at home or dizziness persists. Given pt is HD stable, grossly normal neuro exam at this time, do not believe pt is having a CVA or worsening anemia or dehydration to the point of necessitating inpt admission. Pt tolerated PO challenge, states she is not feeling better but also does not want to stay in the ER for further testing. Advised her to f/u closely with her GI and PMD if sx do not improve or return to the ER if she is not tolerating PO at home or dizziness persists. Given pt is HD stable, grossly normal neuro exam at this time, do not believe pt is having a CVA or worsening anemia or dehydration to the point of necessitating inpt admission.  **ATTENDING ADDENDUM (Dr. Real Cooper): patient serially evaluated throughout ED course. NO acute deterioration up to this time in the ED. Agree with above notation by EM resident Blas. NO evidence of acute surgical abdominal process. NO evidence of central cause for symptoms. Suspect that patient's symptoms may be indicative of post-procedural effects from recent general anesthesia required for hip replacement and/or from post-procedure analgesics (patient admits adherence with tramadol and recent ?gabapentin). NO evidence of intracerebral hemorrhage or cerebrovascular accident at this time. Offered admission re: ?dehydration, however, patient able to tolerate oral intake, and prefers outpatient followup with primary care physician/provider (orthopedics, GI). Extensive anticipatory guidance provided to patient +/or family member(s) throughout ED course. Patient appears STABLE for discharge at this time. Rec'd s/o from day team pending lab results, reassessment and PO chal. Hyopkalemia to be repleted on pt not anemic to the point of requiring transfusion. Pt states she still feels "distant" and like she is going to pass out despite zofran, IVF. Will give add'l IVF and meclizine as she reports dizziness on bilateral EOM. Remainder of neuro exam grossly intact apart from RLE weakness 2/2 pain.  **ATTENDING ADDENDUM (Dr. Real Cooper): patient serially evaluated throughout ED course. NO acute deterioration up to this time in the ED. Agree with above notation by EM resident Blas. Personally reassessed. Abdomen with mild tenderness in the epigastrium, but without obvious findings to indicate acute surgical abdominal process. ED diagnostics up to this time acknowledged, reviewed and noted. Will continue to observe and monitor closely. Anticipatory guidance provided by ED team.

## 2021-04-11 NOTE — ED PROVIDER NOTE - NSFOLLOWUPCLINICS_GEN_ALL_ED_FT
[facility]  Gastroenterology  [address] [place] [address] [address]  [place] [address]  Phone: [phone]  Fax:

## 2021-04-11 NOTE — ED PROVIDER NOTE - CHPI ED SYMPTOMS POS
**ATTENDING ADDENDUM (Dr. Real Cooper): retching; POSITIVE generalized weakness, malaise, fatigue, and "feeling like I'm going to pass out"/NAUSEA/PAIN/TENDERNESS/VOMITING

## 2021-04-12 ENCOUNTER — NON-APPOINTMENT (OUTPATIENT)
Age: 76
End: 2021-04-12

## 2021-05-24 ENCOUNTER — APPOINTMENT (OUTPATIENT)
Dept: INTERNAL MEDICINE | Facility: CLINIC | Age: 76
End: 2021-05-24
Payer: MEDICARE

## 2021-05-24 VITALS — TEMPERATURE: 97.9 F | HEART RATE: 78 BPM | OXYGEN SATURATION: 99 %

## 2021-05-24 PROCEDURE — 99214 OFFICE O/P EST MOD 30 MIN: CPT | Mod: 25

## 2021-05-24 PROCEDURE — 36415 COLL VENOUS BLD VENIPUNCTURE: CPT

## 2021-05-25 VITALS — DIASTOLIC BLOOD PRESSURE: 70 MMHG | SYSTOLIC BLOOD PRESSURE: 108 MMHG

## 2021-05-25 NOTE — HISTORY OF PRESENT ILLNESS
[de-identified] : The patient comes in because she isn't feeling well.  She feels like she is going to "pass out."  She has a headache and sinus pain.  She just completed a ten day course of Amoxicillin for a root canal.  She feels "distant from the world".  She has fatigue, she feels warm.  She says she hasn't felt well since the surgery.  Symptoms going on for at least 2-3 weeks.   She can feel dizzy.  Weight is stable or up.  \par \par She saw her endocrinologist last week and had blood tests done but she isn't able to access it.\par \par She had gone to the ER after the surgery and she was felt to be dehydrated.  She then had gastrointestinal symptoms and she saw Dr. Vicente.

## 2021-05-25 NOTE — ASSESSMENT
[FreeTextEntry1] : The patient has several symptoms which are nonspecific.  She appears well with an unremarkable exam.  Do not see new medical pathology.  Po fluids advised since she might be prone to dehydration.  Will check a CBC and iron studies- she has had a previous anemia.  Check a metabolic and TFTs, B-12 and HgBA1c.\par \par Check lipids since she is due.  Discussed diet.\par \par There is likely a depression component to her symptoms.\par \par She has had the COVID-19 vaccine.\par \par Mammogram 1/21.\par \par She has seen Dr. Vicente.  Colonoscopy UTD.

## 2021-05-29 ENCOUNTER — NON-APPOINTMENT (OUTPATIENT)
Age: 76
End: 2021-05-29

## 2021-05-29 LAB
25(OH)D3 SERPL-MCNC: 54.5 NG/ML
ALBUMIN SERPL ELPH-MCNC: 4.1 G/DL
ALP BLD-CCNC: 111 U/L
ALT SERPL-CCNC: 18 U/L
ANION GAP SERPL CALC-SCNC: 11 MMOL/L
AST SERPL-CCNC: 18 U/L
BASOPHILS # BLD AUTO: 0.05 K/UL
BASOPHILS NFR BLD AUTO: 0.6 %
BILIRUB SERPL-MCNC: 0.2 MG/DL
BUN SERPL-MCNC: 21 MG/DL
CALCIUM SERPL-MCNC: 9.1 MG/DL
CHLORIDE SERPL-SCNC: 103 MMOL/L
CHOLEST SERPL-MCNC: 177 MG/DL
CO2 SERPL-SCNC: 24 MMOL/L
CREAT SERPL-MCNC: 0.77 MG/DL
EOSINOPHIL # BLD AUTO: 0.08 K/UL
EOSINOPHIL NFR BLD AUTO: 1 %
ESTIMATED AVERAGE GLUCOSE: 105 MG/DL
FERRITIN SERPL-MCNC: 70 NG/ML
GLUCOSE SERPL-MCNC: 94 MG/DL
HBA1C MFR BLD HPLC: 5.3 %
HCT VFR BLD CALC: 35.5 %
HDLC SERPL-MCNC: 64 MG/DL
HGB BLD-MCNC: 11 G/DL
IMM GRANULOCYTES NFR BLD AUTO: 0.1 %
IRON SATN MFR SERPL: 30 %
IRON SERPL-MCNC: 78 UG/DL
LDLC SERPL CALC-MCNC: 93 MG/DL
LYMPHOCYTES # BLD AUTO: 1.57 K/UL
LYMPHOCYTES NFR BLD AUTO: 20.2 %
MAN DIFF?: NORMAL
MCHC RBC-ENTMCNC: 28.3 PG
MCHC RBC-ENTMCNC: 31 GM/DL
MCV RBC AUTO: 91.3 FL
MONOCYTES # BLD AUTO: 0.51 K/UL
MONOCYTES NFR BLD AUTO: 6.6 %
NEUTROPHILS # BLD AUTO: 5.54 K/UL
NEUTROPHILS NFR BLD AUTO: 71.5 %
NONHDLC SERPL-MCNC: 112 MG/DL
PLATELET # BLD AUTO: 288 K/UL
POTASSIUM SERPL-SCNC: 4.3 MMOL/L
PROT SERPL-MCNC: 6.6 G/DL
RBC # BLD: 3.89 M/UL
RBC # FLD: 14.5 %
SODIUM SERPL-SCNC: 138 MMOL/L
T4 FREE SERPL-MCNC: 0.9 NG/DL
TIBC SERPL-MCNC: 260 UG/DL
TRIGL SERPL-MCNC: 98 MG/DL
TSH SERPL-ACNC: 1.07 UIU/ML
UIBC SERPL-MCNC: 181 UG/DL
VIT B12 SERPL-MCNC: 843 PG/ML
WBC # FLD AUTO: 7.76 K/UL

## 2021-06-07 ENCOUNTER — TRANSCRIPTION ENCOUNTER (OUTPATIENT)
Age: 76
End: 2021-06-07

## 2021-06-11 ENCOUNTER — TRANSCRIPTION ENCOUNTER (OUTPATIENT)
Age: 76
End: 2021-06-11

## 2021-07-30 ENCOUNTER — APPOINTMENT (OUTPATIENT)
Dept: INTERNAL MEDICINE | Facility: CLINIC | Age: 76
End: 2021-07-30
Payer: MEDICARE

## 2021-07-30 ENCOUNTER — LABORATORY RESULT (OUTPATIENT)
Age: 76
End: 2021-07-30

## 2021-07-30 VITALS
TEMPERATURE: 97 F | WEIGHT: 125 LBS | OXYGEN SATURATION: 99 % | HEART RATE: 74 BPM | BODY MASS INDEX: 21.34 KG/M2 | HEIGHT: 64 IN

## 2021-07-30 DIAGNOSIS — K59.00 CONSTIPATION, UNSPECIFIED: ICD-10-CM

## 2021-07-30 PROCEDURE — 99214 OFFICE O/P EST MOD 30 MIN: CPT | Mod: 25

## 2021-07-30 PROCEDURE — 36415 COLL VENOUS BLD VENIPUNCTURE: CPT

## 2021-08-04 ENCOUNTER — NON-APPOINTMENT (OUTPATIENT)
Age: 76
End: 2021-08-04

## 2021-08-04 VITALS — DIASTOLIC BLOOD PRESSURE: 60 MMHG | SYSTOLIC BLOOD PRESSURE: 100 MMHG

## 2021-08-04 LAB
ALBUMIN SERPL ELPH-MCNC: 4.1 G/DL
ALP BLD-CCNC: 100 U/L
ALT SERPL-CCNC: 20 U/L
ANION GAP SERPL CALC-SCNC: 9 MMOL/L
AST SERPL-CCNC: 20 U/L
BASOPHILS # BLD AUTO: 0.03 K/UL
BASOPHILS NFR BLD AUTO: 0.6 %
BILIRUB SERPL-MCNC: 0.3 MG/DL
BUN SERPL-MCNC: 17 MG/DL
CALCIUM SERPL-MCNC: 9.7 MG/DL
CHLORIDE SERPL-SCNC: 106 MMOL/L
CK SERPL-CCNC: 84 U/L
CO2 SERPL-SCNC: 27 MMOL/L
CREAT SERPL-MCNC: 0.78 MG/DL
CRP SERPL-MCNC: <3 MG/L
EOSINOPHIL # BLD AUTO: 0.07 K/UL
EOSINOPHIL NFR BLD AUTO: 1.4 %
ERYTHROCYTE [SEDIMENTATION RATE] IN BLOOD BY WESTERGREN METHOD: 22 MM/HR
FERRITIN SERPL-MCNC: 31 NG/ML
GLUCOSE SERPL-MCNC: 77 MG/DL
HCT VFR BLD CALC: 36 %
HGB BLD-MCNC: 11.4 G/DL
IMM GRANULOCYTES NFR BLD AUTO: 0.2 %
IRON SATN MFR SERPL: 28 %
IRON SERPL-MCNC: 79 UG/DL
LYMPHOCYTES # BLD AUTO: 1.11 K/UL
LYMPHOCYTES NFR BLD AUTO: 21.9 %
MAN DIFF?: NORMAL
MCHC RBC-ENTMCNC: 27.8 PG
MCHC RBC-ENTMCNC: 31.7 GM/DL
MCV RBC AUTO: 87.8 FL
MONOCYTES # BLD AUTO: 0.37 K/UL
MONOCYTES NFR BLD AUTO: 7.3 %
NEUTROPHILS # BLD AUTO: 3.48 K/UL
NEUTROPHILS NFR BLD AUTO: 68.6 %
PLATELET # BLD AUTO: 240 K/UL
POTASSIUM SERPL-SCNC: 4.3 MMOL/L
PROT SERPL-MCNC: 6.3 G/DL
RBC # BLD: 4.1 M/UL
RBC # FLD: 13.4 %
SODIUM SERPL-SCNC: 142 MMOL/L
TIBC SERPL-MCNC: 282 UG/DL
UIBC SERPL-MCNC: 203 UG/DL
WBC # FLD AUTO: 5.07 K/UL

## 2021-08-04 NOTE — HISTORY OF PRESENT ILLNESS
[FreeTextEntry8] : The patient has symptoms for two weeks- she has fatigue, lethargy, mild depression symptoms, lack of energy.  She feels sinus pressure and a headache.  She feels "blah". No chest pain, dyspnea, fever, chills, GI or  symptoms.  Her ears pop.  \par \par She is taking B-12 pills.

## 2021-08-04 NOTE — ASSESSMENT
[FreeTextEntry1] : The patient has fatigue and nonspecific symptoms.  She appears comfortable.  She has a headache and possible sinus symptoms and she might have a sinusitis.  Will treat with Augmentin.  OTC meds discussed.\par \par She has constipation and she can use Docusate.\par \par There might be a depression component to her symptoms.  \par \par Check blood tests to evaluate for anemia or other causes of fatigue.  \par \par The BP is on the lower side but ok at 100/60. She is not on antihypertensives.

## 2021-09-08 ENCOUNTER — RX RENEWAL (OUTPATIENT)
Age: 76
End: 2021-09-08

## 2021-09-27 DIAGNOSIS — Z87.39 PERSONAL HISTORY OF OTHER DISEASES OF THE MUSCULOSKELETAL SYSTEM AND CONNECTIVE TISSUE: ICD-10-CM

## 2021-09-29 ENCOUNTER — APPOINTMENT (OUTPATIENT)
Dept: INTERNAL MEDICINE | Facility: CLINIC | Age: 76
End: 2021-09-29
Payer: MEDICARE

## 2021-09-29 PROCEDURE — 36415 COLL VENOUS BLD VENIPUNCTURE: CPT

## 2021-09-30 LAB
25(OH)D3 SERPL-MCNC: 59.3 NG/ML
ALBUMIN SERPL ELPH-MCNC: 4.2 G/DL
ALP BLD-CCNC: 106 U/L
ALT SERPL-CCNC: 17 U/L
ANION GAP SERPL CALC-SCNC: 10 MMOL/L
APPEARANCE: CLEAR
AST SERPL-CCNC: 18 U/L
BACTERIA: NEGATIVE
BASOPHILS # BLD AUTO: 0.03 K/UL
BASOPHILS NFR BLD AUTO: 0.5 %
BILIRUB SERPL-MCNC: 0.3 MG/DL
BILIRUBIN URINE: NEGATIVE
BLOOD URINE: NEGATIVE
BUN SERPL-MCNC: 14 MG/DL
CALCIUM SERPL-MCNC: 9.5 MG/DL
CHLORIDE SERPL-SCNC: 105 MMOL/L
CHOLEST SERPL-MCNC: 164 MG/DL
CO2 SERPL-SCNC: 27 MMOL/L
COLOR: NORMAL
COVID-19 SPIKE DOMAIN ANTIBODY INTERPRETATION: POSITIVE
CREAT SERPL-MCNC: 0.69 MG/DL
EOSINOPHIL # BLD AUTO: 0.09 K/UL
EOSINOPHIL NFR BLD AUTO: 1.6 %
ESTIMATED AVERAGE GLUCOSE: 108 MG/DL
FERRITIN SERPL-MCNC: 43 NG/ML
GLUCOSE QUALITATIVE U: NEGATIVE
GLUCOSE SERPL-MCNC: 89 MG/DL
HBA1C MFR BLD HPLC: 5.4 %
HCT VFR BLD CALC: 36.8 %
HDLC SERPL-MCNC: 67 MG/DL
HGB BLD-MCNC: 11.5 G/DL
HYALINE CASTS: 1 /LPF
IMM GRANULOCYTES NFR BLD AUTO: 0.2 %
IRON SATN MFR SERPL: 30 %
IRON SERPL-MCNC: 87 UG/DL
KETONES URINE: NEGATIVE
LDLC SERPL CALC-MCNC: 83 MG/DL
LEUKOCYTE ESTERASE URINE: ABNORMAL
LYMPHOCYTES # BLD AUTO: 1.6 K/UL
LYMPHOCYTES NFR BLD AUTO: 27.6 %
MAN DIFF?: NORMAL
MCHC RBC-ENTMCNC: 27.5 PG
MCHC RBC-ENTMCNC: 31.3 GM/DL
MCV RBC AUTO: 88 FL
MICROSCOPIC-UA: NORMAL
MONOCYTES # BLD AUTO: 0.35 K/UL
MONOCYTES NFR BLD AUTO: 6 %
NEUTROPHILS # BLD AUTO: 3.71 K/UL
NEUTROPHILS NFR BLD AUTO: 64.1 %
NITRITE URINE: NEGATIVE
NONHDLC SERPL-MCNC: 96 MG/DL
PH URINE: 6
PLATELET # BLD AUTO: 276 K/UL
POTASSIUM SERPL-SCNC: 3.8 MMOL/L
PROT SERPL-MCNC: 6.5 G/DL
PROTEIN URINE: NEGATIVE
RBC # BLD: 4.18 M/UL
RBC # FLD: 14.9 %
RED BLOOD CELLS URINE: 1 /HPF
SARS-COV-2 AB SERPL IA-ACNC: >250 U/ML
SODIUM SERPL-SCNC: 142 MMOL/L
SPECIFIC GRAVITY URINE: 1.01
SQUAMOUS EPITHELIAL CELLS: 2 /HPF
T4 FREE SERPL-MCNC: 1 NG/DL
TIBC SERPL-MCNC: 285 UG/DL
TRIGL SERPL-MCNC: 67 MG/DL
TSH SERPL-ACNC: 1.29 UIU/ML
UIBC SERPL-MCNC: 198 UG/DL
UROBILINOGEN URINE: NORMAL
WBC # FLD AUTO: 5.79 K/UL
WHITE BLOOD CELLS URINE: 2 /HPF

## 2021-10-01 ENCOUNTER — APPOINTMENT (OUTPATIENT)
Dept: INTERNAL MEDICINE | Facility: CLINIC | Age: 76
End: 2021-10-01
Payer: MEDICARE

## 2021-10-01 VITALS
OXYGEN SATURATION: 98 % | BODY MASS INDEX: 21.17 KG/M2 | WEIGHT: 124 LBS | HEART RATE: 78 BPM | TEMPERATURE: 97.3 F | HEIGHT: 64 IN

## 2021-10-01 DIAGNOSIS — Z01.818 ENCOUNTER FOR OTHER PREPROCEDURAL EXAMINATION: ICD-10-CM

## 2021-10-01 DIAGNOSIS — F41.9 ANXIETY DISORDER, UNSPECIFIED: ICD-10-CM

## 2021-10-01 DIAGNOSIS — Z87.898 PERSONAL HISTORY OF OTHER SPECIFIED CONDITIONS: ICD-10-CM

## 2021-10-01 DIAGNOSIS — Z87.09 PERSONAL HISTORY OF OTHER DISEASES OF THE RESPIRATORY SYSTEM: ICD-10-CM

## 2021-10-01 DIAGNOSIS — R10.31 RIGHT LOWER QUADRANT PAIN: ICD-10-CM

## 2021-10-01 DIAGNOSIS — F32.A DEPRESSION, UNSPECIFIED: ICD-10-CM

## 2021-10-01 DIAGNOSIS — Z12.39 ENCOUNTER FOR OTHER SCREENING FOR MALIGNANT NEOPLASM OF BREAST: ICD-10-CM

## 2021-10-01 DIAGNOSIS — Z23 ENCOUNTER FOR IMMUNIZATION: ICD-10-CM

## 2021-10-01 PROCEDURE — G0439: CPT

## 2021-10-01 PROCEDURE — G0008: CPT

## 2021-10-01 PROCEDURE — 90662 IIV NO PRSV INCREASED AG IM: CPT

## 2021-10-01 RX ORDER — MESALAMINE 1.2 G/1
1.2 TABLET, DELAYED RELEASE ORAL DAILY
Refills: 2 | Status: DISCONTINUED | COMMUNITY
Start: 2018-09-27 | End: 2021-10-01

## 2021-10-01 RX ORDER — TRAZODONE HYDROCHLORIDE 50 MG/1
50 TABLET ORAL
Qty: 60 | Refills: 0 | Status: DISCONTINUED | COMMUNITY
Start: 2019-07-24 | End: 2021-10-01

## 2021-10-01 RX ORDER — AMOXICILLIN AND CLAVULANATE POTASSIUM 875; 125 MG/1; MG/1
875-125 TABLET, COATED ORAL
Qty: 20 | Refills: 0 | Status: DISCONTINUED | COMMUNITY
Start: 2021-04-01 | End: 2021-10-01

## 2021-10-01 RX ORDER — FLUOXETINE HYDROCHLORIDE 20 MG/1
20 TABLET ORAL
Qty: 90 | Refills: 1 | Status: ACTIVE | COMMUNITY
Start: 2019-07-25

## 2021-10-06 PROBLEM — H16.001 CORNEAL ULCER OF RIGHT EYE: Status: ACTIVE | Noted: 2017-10-23

## 2021-10-27 ENCOUNTER — APPOINTMENT (OUTPATIENT)
Dept: OBGYN | Facility: CLINIC | Age: 76
End: 2021-10-27
Payer: MEDICARE

## 2021-10-27 VITALS — DIASTOLIC BLOOD PRESSURE: 70 MMHG | SYSTOLIC BLOOD PRESSURE: 110 MMHG

## 2021-10-27 PROCEDURE — 99214 OFFICE O/P EST MOD 30 MIN: CPT

## 2021-10-28 ENCOUNTER — ASOB RESULT (OUTPATIENT)
Age: 76
End: 2021-10-28

## 2021-10-28 ENCOUNTER — APPOINTMENT (OUTPATIENT)
Dept: OBGYN | Facility: CLINIC | Age: 76
End: 2021-10-28
Payer: MEDICARE

## 2021-10-28 LAB — BACTERIA UR CULT: NORMAL

## 2021-10-28 PROCEDURE — 76830 TRANSVAGINAL US NON-OB: CPT

## 2021-10-29 ENCOUNTER — NON-APPOINTMENT (OUTPATIENT)
Age: 76
End: 2021-10-29

## 2021-11-01 ENCOUNTER — NON-APPOINTMENT (OUTPATIENT)
Age: 76
End: 2021-11-01

## 2021-11-01 VITALS — SYSTOLIC BLOOD PRESSURE: 115 MMHG | DIASTOLIC BLOOD PRESSURE: 65 MMHG

## 2021-11-01 NOTE — HISTORY OF PRESENT ILLNESS
[FreeTextEntry1] : The patient is here for a routine visit.  [de-identified] : She has fatigue.  No new specific symptoms.\par \par She walks and has mild dyspnea with exertion that is not new. No chest pain.  Her diet is healthy.\par \par

## 2021-11-01 NOTE — HEALTH RISK ASSESSMENT
[No] : No [No falls in past year] : Patient reported no falls in the past year [0] : 2) Feeling down, depressed, or hopeless: Not at all (0) [Fully functional (bathing, dressing, toileting, transferring, walking, feeding)] : Fully functional (bathing, dressing, toileting, transferring, walking, feeding) [Fully functional (using the telephone, shopping, preparing meals, housekeeping, doing laundry, using] : Fully functional and needs no help or supervision to perform IADLs (using the telephone, shopping, preparing meals, housekeeping, doing laundry, using transportation, managing medications and managing finances) [] : No [[id number]] : 0 [Reports changes in hearing] : Reports no changes in hearing [Reports changes in vision] : Reports no changes in vision [Reports changes in dental health] : Reports no changes in dental health

## 2021-11-01 NOTE — ASSESSMENT
[FreeTextEntry1] : She appears well and she was counseled.  Discussed diet and exercise.  Lipids fine.\par \par Other blood tests reviewed and are unremarkable.\par \par She sees Dr. Stoddard.  A nuclear stress test is planned because of her symptoms.  \par \par Flu vaccine today.  S/p Prevnar, Pneumovax, Shingrix.\par \par She has had the COVID-19 vaccine and booster.\par \par She sees her psychiatrist, Dr. Griffin\par \par Mammogram 1/21.  Gyn due and advised.  DEXA by her endocrinologist.\par \par Colonoscopy 8/16.\par \par She sees a dermatologist and ophthalmologist.

## 2022-01-16 ENCOUNTER — TRANSCRIPTION ENCOUNTER (OUTPATIENT)
Age: 77
End: 2022-01-16

## 2022-01-19 ENCOUNTER — RX RENEWAL (OUTPATIENT)
Age: 77
End: 2022-01-19

## 2022-01-25 ENCOUNTER — APPOINTMENT (OUTPATIENT)
Dept: OBGYN | Facility: CLINIC | Age: 77
End: 2022-01-25
Payer: MEDICARE

## 2022-01-25 VITALS
DIASTOLIC BLOOD PRESSURE: 72 MMHG | HEIGHT: 64 IN | BODY MASS INDEX: 22.36 KG/M2 | SYSTOLIC BLOOD PRESSURE: 106 MMHG | WEIGHT: 131 LBS

## 2022-01-25 DIAGNOSIS — Z86.018 PERSONAL HISTORY OF OTHER BENIGN NEOPLASM: ICD-10-CM

## 2022-01-25 PROCEDURE — G0101: CPT | Mod: GA

## 2022-01-25 PROCEDURE — G0328 FECAL BLOOD SCRN IMMUNOASSAY: CPT | Mod: QW

## 2022-01-26 LAB
APPEARANCE: ABNORMAL
BACTERIA: ABNORMAL
BILIRUBIN URINE: NEGATIVE
BLOOD URINE: ABNORMAL
COLOR: YELLOW
GLUCOSE QUALITATIVE U: NEGATIVE
HPV HIGH+LOW RISK DNA PNL CVX: NOT DETECTED
HYALINE CASTS: 0 /LPF
KETONES URINE: NEGATIVE
LEUKOCYTE ESTERASE URINE: ABNORMAL
MICROSCOPIC-UA: NORMAL
NITRITE URINE: POSITIVE
PH URINE: 6
PROTEIN URINE: NORMAL
RED BLOOD CELLS URINE: 16 /HPF
SPECIFIC GRAVITY URINE: 1.02
SQUAMOUS EPITHELIAL CELLS: 5 /HPF
UROBILINOGEN URINE: NORMAL
WHITE BLOOD CELLS URINE: 138 /HPF

## 2022-01-28 LAB — BACTERIA UR CULT: ABNORMAL

## 2022-01-30 LAB — CYTOLOGY CVX/VAG DOC THIN PREP: NORMAL

## 2022-01-31 ENCOUNTER — APPOINTMENT (OUTPATIENT)
Dept: INTERNAL MEDICINE | Facility: CLINIC | Age: 77
End: 2022-01-31
Payer: MEDICARE

## 2022-01-31 VITALS
HEIGHT: 64 IN | TEMPERATURE: 97.2 F | BODY MASS INDEX: 21.17 KG/M2 | WEIGHT: 124 LBS | OXYGEN SATURATION: 99 % | HEART RATE: 78 BPM

## 2022-01-31 DIAGNOSIS — R55 SYNCOPE AND COLLAPSE: ICD-10-CM

## 2022-01-31 PROCEDURE — 99214 OFFICE O/P EST MOD 30 MIN: CPT | Mod: 25

## 2022-02-02 VITALS — SYSTOLIC BLOOD PRESSURE: 100 MMHG | DIASTOLIC BLOOD PRESSURE: 60 MMHG

## 2022-02-02 VITALS — DIASTOLIC BLOOD PRESSURE: 60 MMHG | SYSTOLIC BLOOD PRESSURE: 110 MMHG

## 2022-02-02 PROBLEM — R55 SYNCOPE: Status: ACTIVE | Noted: 2022-02-02

## 2022-02-02 NOTE — ASSESSMENT
[FreeTextEntry1] : The patient reports that she has recently had a sinusitis, a UTI and then COVID-19.  She then had a syncopal episode and was evaluated at [facility].  She might have had a slightly elevated troponin and after consultation with Dr. Stoddard, she is going to follow-up with him.  She will call him today.  \par \par She now appears well although she is anxious and she was counseled.  \par \par She is still on Macrobid and she is seeing Dr. Godoy tomorrow.  \par \par Po fluids advised.\par \par She was advised to have the hospital records sent here for review.

## 2022-02-02 NOTE — HISTORY OF PRESENT ILLNESS
[de-identified] : The patient was in [place] and she had a sinusitis treated with Doxycycline.  She then says she had a UTI treated with Macrobid.  She was prescribed a Medrol Dosepack for the sinuses.  ON 1/16/21 she tested positive for COVID-19- she had a headache, mild URI symptoms. No fever.\par \par She saw a gyn and was told of a UTI.\par \par Four days ago she had a mammogram and breast US that she says was ok.\par \par She saw a dermatologist for a skin lesion.\par \par Three days ago she had an acute LOC and EMS was called.  She was taken to [facility] and she says an EKG was ok.  She was given IV antibiotics.  She says cardiac enzymes were elevated.  A coronary angiogram was advised.  The patient spoke to Dr. Stoddard.  The patient then decided to be discharged and she is going to follow-up with him.  She was told of syncope. Troponins slightly elevated.  D-dimer was also elevated which led to a CTA chest which did not show a PE.  A NCHCT was done.\par \par She has had the COVID-19 booster and flu vaccine. \par \par She now has fatigue.  No chest pain.  She might have slight dyspnea.  She still has a headache.

## 2022-03-09 ENCOUNTER — NON-APPOINTMENT (OUTPATIENT)
Age: 77
End: 2022-03-09

## 2022-03-22 ENCOUNTER — APPOINTMENT (OUTPATIENT)
Dept: OBGYN | Facility: CLINIC | Age: 77
End: 2022-03-22
Payer: MEDICARE

## 2022-03-22 VITALS — DIASTOLIC BLOOD PRESSURE: 73 MMHG | SYSTOLIC BLOOD PRESSURE: 116 MMHG

## 2022-03-22 PROCEDURE — 77080 DXA BONE DENSITY AXIAL: CPT

## 2022-03-22 PROCEDURE — 99213 OFFICE O/P EST LOW 20 MIN: CPT

## 2022-03-24 ENCOUNTER — RX RENEWAL (OUTPATIENT)
Age: 77
End: 2022-03-24

## 2022-03-28 ENCOUNTER — APPOINTMENT (OUTPATIENT)
Dept: INTERNAL MEDICINE | Facility: CLINIC | Age: 77
End: 2022-03-28
Payer: MEDICARE

## 2022-03-28 VITALS
TEMPERATURE: 97.2 F | HEIGHT: 64 IN | HEART RATE: 71 BPM | BODY MASS INDEX: 21 KG/M2 | WEIGHT: 123 LBS | OXYGEN SATURATION: 97 %

## 2022-03-28 DIAGNOSIS — R51.9 HEADACHE, UNSPECIFIED: ICD-10-CM

## 2022-03-28 PROCEDURE — 99213 OFFICE O/P EST LOW 20 MIN: CPT

## 2022-04-04 NOTE — ASSESSMENT
[FreeTextEntry1] : She has symptoms typical of sinusitis which she has had before.  Will treat with Augmentin. OTC meds discussed.  Call PRN.\par \par We also discussed the recent CT chest- she has had two CT scans, one at [facility] when she was admitted and one through [place].  There was an abnormality which could have been residual from the COVID-19 infection.  She saw a pulmonologist, Dr. Duckworth and a three month follow-up CT was advised.  The finding were felt likely to be benign. Malignancy couldn't be fully excluded.  The patient understands and will go for the follow-up CT chest when due.  She was also prescribed an inhaler.

## 2022-04-04 NOTE — HISTORY OF PRESENT ILLNESS
[FreeTextEntry8] : She has a headache, chest pain, facial pressure, sense of a lump in her throat, congestion.  No fever, ear pain, dyspnea. Symptoms for a day.  No sick contacts.

## 2022-04-11 PROBLEM — Z11.59 SCREENING FOR VIRAL DISEASE: Status: ACTIVE | Noted: 2020-10-13

## 2022-04-20 ENCOUNTER — OUTPATIENT (OUTPATIENT)
Dept: OUTPATIENT SERVICES | Facility: HOSPITAL | Age: 77
LOS: 1 days | End: 2022-04-20
Payer: MEDICARE

## 2022-04-20 ENCOUNTER — APPOINTMENT (OUTPATIENT)
Dept: CT IMAGING | Facility: CLINIC | Age: 77
End: 2022-04-20
Payer: MEDICARE

## 2022-04-20 DIAGNOSIS — J32.0 CHRONIC MAXILLARY SINUSITIS: ICD-10-CM

## 2022-04-20 DIAGNOSIS — Z96.651 PRESENCE OF RIGHT ARTIFICIAL KNEE JOINT: Chronic | ICD-10-CM

## 2022-04-20 PROCEDURE — 70486 CT MAXILLOFACIAL W/O DYE: CPT | Mod: MH

## 2022-04-20 PROCEDURE — 70486 CT MAXILLOFACIAL W/O DYE: CPT | Mod: 26,MH

## 2022-05-16 ENCOUNTER — NON-APPOINTMENT (OUTPATIENT)
Age: 77
End: 2022-05-16

## 2022-05-24 ENCOUNTER — NON-APPOINTMENT (OUTPATIENT)
Age: 77
End: 2022-05-24

## 2022-05-26 ENCOUNTER — NON-APPOINTMENT (OUTPATIENT)
Age: 77
End: 2022-05-26

## 2022-06-02 ENCOUNTER — NON-APPOINTMENT (OUTPATIENT)
Age: 77
End: 2022-06-02

## 2022-06-05 ENCOUNTER — NON-APPOINTMENT (OUTPATIENT)
Age: 77
End: 2022-06-05

## 2022-08-08 ENCOUNTER — NON-APPOINTMENT (OUTPATIENT)
Age: 77
End: 2022-08-08

## 2022-08-13 ENCOUNTER — RX RENEWAL (OUTPATIENT)
Age: 77
End: 2022-08-13

## 2022-09-15 ENCOUNTER — APPOINTMENT (OUTPATIENT)
Dept: INTERNAL MEDICINE | Facility: CLINIC | Age: 77
End: 2022-09-15

## 2022-09-15 VITALS
HEIGHT: 64 IN | WEIGHT: 117 LBS | TEMPERATURE: 97.7 F | OXYGEN SATURATION: 97 % | BODY MASS INDEX: 19.97 KG/M2 | HEART RATE: 73 BPM

## 2022-09-15 DIAGNOSIS — N39.0 URINARY TRACT INFECTION, SITE NOT SPECIFIED: ICD-10-CM

## 2022-09-15 PROCEDURE — 99214 OFFICE O/P EST MOD 30 MIN: CPT | Mod: 25

## 2022-09-15 PROCEDURE — 36415 COLL VENOUS BLD VENIPUNCTURE: CPT

## 2022-09-15 PROCEDURE — 81003 URINALYSIS AUTO W/O SCOPE: CPT | Mod: QW

## 2022-09-15 RX ORDER — NITROFURANTOIN (MONOHYDRATE/MACROCRYSTALS) 25; 75 MG/1; MG/1
100 CAPSULE ORAL
Qty: 10 | Refills: 0 | Status: DISCONTINUED | COMMUNITY
Start: 2022-01-27 | End: 2022-09-15

## 2022-09-15 RX ORDER — DOXYCYCLINE HYCLATE 100 MG/1
100 TABLET ORAL
Qty: 14 | Refills: 0 | Status: DISCONTINUED | COMMUNITY
Start: 2022-04-04 | End: 2022-09-15

## 2022-09-15 RX ORDER — METHYLPREDNISOLONE 4 MG/1
4 TABLET ORAL
Qty: 1 | Refills: 1 | Status: DISCONTINUED | COMMUNITY
Start: 2022-04-05 | End: 2022-09-15

## 2022-09-16 ENCOUNTER — RESULT CHARGE (OUTPATIENT)
Age: 77
End: 2022-09-16

## 2022-09-18 VITALS — DIASTOLIC BLOOD PRESSURE: 58 MMHG | SYSTOLIC BLOOD PRESSURE: 95 MMHG

## 2022-09-18 LAB
ALBUMIN SERPL ELPH-MCNC: 3.9 G/DL
ALP BLD-CCNC: 110 U/L
ALT SERPL-CCNC: 11 U/L
ANION GAP SERPL CALC-SCNC: 11 MMOL/L
AST SERPL-CCNC: 10 U/L
BACTERIA UR CULT: NORMAL
BASOPHILS # BLD AUTO: 0.05 K/UL
BASOPHILS NFR BLD AUTO: 0.5 %
BILIRUB SERPL-MCNC: <0.2 MG/DL
BILIRUB UR QL STRIP: NORMAL
BUN SERPL-MCNC: 26 MG/DL
CALCIUM SERPL-MCNC: 9.2 MG/DL
CHLORIDE SERPL-SCNC: 105 MMOL/L
CO2 SERPL-SCNC: 25 MMOL/L
CREAT SERPL-MCNC: 0.76 MG/DL
EGFR: 82 ML/MIN/1.73M2
EOSINOPHIL # BLD AUTO: 0.12 K/UL
EOSINOPHIL NFR BLD AUTO: 1.2 %
ESTIMATED AVERAGE GLUCOSE: 111 MG/DL
FERRITIN SERPL-MCNC: 115 NG/ML
FOLATE SERPL-MCNC: >20 NG/ML
GLUCOSE SERPL-MCNC: 106 MG/DL
GLUCOSE UR-MCNC: NORMAL
HBA1C MFR BLD HPLC: 5.5 %
HCG UR QL: 0.2 EU/DL
HCT VFR BLD CALC: 36.7 %
HGB BLD-MCNC: 11.3 G/DL
HGB UR QL STRIP.AUTO: NORMAL
IMM GRANULOCYTES NFR BLD AUTO: 0.5 %
IRON SATN MFR SERPL: 31 %
IRON SERPL-MCNC: 74 UG/DL
KETONES UR-MCNC: NORMAL
LEUKOCYTE ESTERASE UR QL STRIP: NORMAL
LYMPHOCYTES # BLD AUTO: 1.88 K/UL
LYMPHOCYTES NFR BLD AUTO: 18.3 %
MAN DIFF?: NORMAL
MCHC RBC-ENTMCNC: 28 PG
MCHC RBC-ENTMCNC: 30.8 GM/DL
MCV RBC AUTO: 90.8 FL
MONOCYTES # BLD AUTO: 0.74 K/UL
MONOCYTES NFR BLD AUTO: 7.2 %
NEUTROPHILS # BLD AUTO: 7.43 K/UL
NEUTROPHILS NFR BLD AUTO: 72.3 %
NITRITE UR QL STRIP: NORMAL
PH UR STRIP: 5.5
PLATELET # BLD AUTO: 304 K/UL
POTASSIUM SERPL-SCNC: 4 MMOL/L
PROT SERPL-MCNC: 6.5 G/DL
PROT UR STRIP-MCNC: NORMAL
RBC # BLD: 4.04 M/UL
RBC # FLD: 15.1 %
SODIUM SERPL-SCNC: 140 MMOL/L
SP GR UR STRIP: >=1.03
T4 FREE SERPL-MCNC: 0.9 NG/DL
TIBC SERPL-MCNC: 241 UG/DL
TSH SERPL-ACNC: 1.09 UIU/ML
UIBC SERPL-MCNC: 166 UG/DL
VIT B12 SERPL-MCNC: 784 PG/ML
WBC # FLD AUTO: 10.27 K/UL

## 2022-09-18 NOTE — ASSESSMENT
[FreeTextEntry1] : The patient appears well but she has fatigue since the surgery and she has lost weight.  She asks if she could be anemic which she has had.  Will check blood tests to look for a cause of the fatigue.  It could be normal post-surgical recovery and she was counseled. The pain may also be a factor although it is improved now.\par \par She requests a UCX since she might have mild symptoms.  She had a Esteban in the hospital.  \par \par She had the COVID-19 vaccine and booster and the bivalent vaccine is planned.

## 2022-09-18 NOTE — PHYSICAL EXAM
[Normal] : normal gait, coordination grossly intact, no focal deficits and deep tendon reflexes were 2+ and symmetric [de-identified] : right wound healing

## 2022-09-18 NOTE — HISTORY OF PRESENT ILLNESS
[FreeTextEntry8] : The patient had  the lung surgery on 8/10/22 at [facility]. Two segments were removed.  The surgery went well and she is felt to be EUNICE.  She has persistent pain at the surgical site and she had a nerve block which didn't help. The pain is now improved and she has stopped Tramadol and she is using Tylenol. Gabapentin was increased to 300 mg TID. Lidoderm patch didn't help.\par \par She has a decreased appetite and she has lost ten pounds. She is tired with decreased energy.  No N/V, diarrhea, abdominal pain, fever, dysphagia, odynophagia.No

## 2022-10-11 ENCOUNTER — APPOINTMENT (OUTPATIENT)
Dept: INTERNAL MEDICINE | Facility: CLINIC | Age: 77
End: 2022-10-11

## 2022-10-11 VITALS
HEIGHT: 64 IN | BODY MASS INDEX: 20.32 KG/M2 | HEART RATE: 78 BPM | TEMPERATURE: 97.5 F | OXYGEN SATURATION: 99 % | WEIGHT: 119 LBS

## 2022-10-11 DIAGNOSIS — Z86.2 PERSONAL HISTORY OF DISEASES OF THE BLOOD AND BLOOD-FORMING ORGANS AND CERTAIN DISORDERS INVOLVING THE IMMUNE MECHANISM: ICD-10-CM

## 2022-10-11 DIAGNOSIS — Z86.39 PERSONAL HISTORY OF OTHER ENDOCRINE, NUTRITIONAL AND METABOLIC DISEASE: ICD-10-CM

## 2022-10-11 PROCEDURE — G0439: CPT

## 2022-10-11 PROCEDURE — G0008: CPT

## 2022-10-11 PROCEDURE — 90662 IIV NO PRSV INCREASED AG IM: CPT

## 2022-10-12 VITALS — DIASTOLIC BLOOD PRESSURE: 60 MMHG | SYSTOLIC BLOOD PRESSURE: 95 MMHG

## 2022-10-12 NOTE — HISTORY OF PRESENT ILLNESS
[FreeTextEntry1] : The patient is here for a routine visit.  [de-identified] : She still has post surgical pain and she is seeing pain management.  She is going to have second nerve block.  She is on Tylenol and Gabapentin.\par \par She also has sciatica and she is going to start PT. \par \par Appetite is ok and weight is up 2-3 pounds.  Diet ok.

## 2022-10-12 NOTE — ASSESSMENT
[FreeTextEntry1] : She appears stronger.  Discussed diet and exercise.  Recent blood tests reviewed- all UTD except lipids and she will do it elsewhere.\par \par She will see the thoracic surgeon in 2/23.\par \par She is seeing pain management as above.\par \par Flu vaccine today.  S/p Prevnar, Pneumovax, Shingrix.\par \par She had the bivalent COVID-19 vaccine.\par \par Colonoscopy 3/21.\par \par She sees her gyn.  Mammogram and breast U/S 1/22. DEXA 3/22.  \par \par She sees her cardiologist.

## 2022-10-21 ENCOUNTER — RX RENEWAL (OUTPATIENT)
Age: 77
End: 2022-10-21

## 2023-01-26 ENCOUNTER — ASOB RESULT (OUTPATIENT)
Age: 78
End: 2023-01-26

## 2023-01-26 ENCOUNTER — APPOINTMENT (OUTPATIENT)
Dept: OBGYN | Facility: CLINIC | Age: 78
End: 2023-01-26
Payer: MEDICARE

## 2023-01-26 VITALS
WEIGHT: 118 LBS | DIASTOLIC BLOOD PRESSURE: 69 MMHG | BODY MASS INDEX: 20.14 KG/M2 | HEIGHT: 64 IN | SYSTOLIC BLOOD PRESSURE: 105 MMHG

## 2023-01-26 DIAGNOSIS — M85.80 OTHER SPECIFIED DISORDERS OF BONE DENSITY AND STRUCTURE, UNSPECIFIED SITE: ICD-10-CM

## 2023-01-26 DIAGNOSIS — Z01.411 ENCOUNTER FOR GYNECOLOGICAL EXAMINATION (GENERAL) (ROUTINE) WITH ABNORMAL FINDINGS: ICD-10-CM

## 2023-01-26 DIAGNOSIS — N90.5 ATROPHY OF VULVA: ICD-10-CM

## 2023-01-26 DIAGNOSIS — N81.4 UTEROVAGINAL PROLAPSE, UNSPECIFIED: ICD-10-CM

## 2023-01-26 PROCEDURE — G0101: CPT | Mod: GA

## 2023-01-26 PROCEDURE — G0328 FECAL BLOOD SCRN IMMUNOASSAY: CPT | Mod: GA,QW

## 2023-01-26 NOTE — HISTORY OF PRESENT ILLNESS
[Patient reported mammogram was normal] : Patient reported mammogram was normal [Patient reported bone density results were abnormal] : Patient reported bone density results were abnormal [Patient reported colonoscopy was normal] : Patient reported colonoscopy was normal [FreeTextEntry1] : 2023. SUSAN MAYER 77 year old female  LMP 50. She presents for an annual gyn exam \par \par She feels well and offers no complaints. She denies vaginal bleeding, abnormal vaginal discharge or vaginitis sxs. No urinary complaints. BM is normal per patient, no bloody stool. She denies abdominal and pelvic pain.\par \par She reports she had covid x2. She had nodules removed from lungs this past year. She has had vertigo the last 2 days, she has only had an episode one other time. Pt gets followed by doctor for hx of thyroid nodules. Otherwise no new medical conditions, medications or surgeries.\par \par PMHx atrophy, osteoporosis, skin cancer, lung cancer, fibroids, 2nd degree uterine prolapse\par SHx cardiac stents x2, tubal ligation, cataracts, hip replacement, knee surgery, nodules removed from lungs. \par Allergies: Cipro, Sulfa\par Meds: Gabapentin, Lipitor, Folic acid, xanax, baby ASA, vitamins, omeprazole\par FHx: PGF had colon ca. Denies FHx of breast, ovarian or uterine cancer.  [TextBox_4] : pelvic sono-1/23- 2 mm endometrial lining, 5 mm fibroid, normal uterus and ovaries [Mammogramdate] : 1/22 [BreastSonogramDate] : 1/22 [BoneDensityDate] : 3/22 [TextBox_37] : osteopenia [ColonoscopyDate] : 3/22

## 2023-01-26 NOTE — PHYSICAL EXAM
[Chaperone Present] : A chaperone was present in the examining room during all aspects of the physical examination [Appropriately responsive] : appropriately responsive [Alert] : alert [No Acute Distress] : no acute distress [No Lymphadenopathy] : no lymphadenopathy [Regular Rate Rhythm] : regular rate rhythm [No Murmurs] : no murmurs [Clear to Auscultation B/L] : clear to auscultation bilaterally [Soft] : soft [Non-tender] : non-tender [Non-distended] : non-distended [No HSM] : No HSM [No Lesions] : no lesions [No Mass] : no mass [Oriented x3] : oriented x3 [Examination Of The Breasts] : a normal appearance [No Masses] : no breast masses were palpable [Vulvar Atrophy] : vulvar atrophy [Labia Majora] : normal [Labia Minora] : normal [Atrophy] : atrophy [Uterine Prolapse] : uterine prolapse [Normal] : normal [Uterine Adnexae] : normal [FreeTextEntry4] : 2nd degree uterine prolapse [FreeTextEntry9] : Guaiac negative. No masses noted.

## 2023-01-26 NOTE — PLAN
[FreeTextEntry1] : 77 year old female presents for routine gyn exam - PMHx atrophy, osteoporosis, skin cancer, lung cancer, fibroids, 2nd degree uterine prolapse. SHx cardiac stents x2, tubal ligation, cataracts, hip replacement, knee surgery, nodules removed from lungs. \par BSE taught\par Breast and pelvic exam performed\par 1/22 mammogram and breast sonogram. Rx given, due now\par 3/22 colonoscopy, due in 3/27\par Todays Pelvic sono showed 5 mm fibroid. Repeat in 1/24\par \par Osteopenia:\par 3/22 DEXA bone density. Rx given, due in 3/24\par D/w pt Calcium 500 mg and Vitamin D 1000U intake, as well as weight-bearing exercise, such as walking, for 30 min daily\par \par Uterine Prolapse\par D/w pt pessary. Reviewed R/B/A.\par Advised pt to call office if it starts to bother her\par  \par RTO in 1 year or PRN

## 2023-01-27 ENCOUNTER — RX RENEWAL (OUTPATIENT)
Age: 78
End: 2023-01-27

## 2023-04-04 DIAGNOSIS — G89.29 LOW BACK PAIN, UNSPECIFIED: ICD-10-CM

## 2023-04-04 DIAGNOSIS — M54.50 LOW BACK PAIN, UNSPECIFIED: ICD-10-CM

## 2023-04-04 LAB
APPEARANCE: CLEAR
BACTERIA: NEGATIVE
BILIRUBIN URINE: NEGATIVE
BLOOD URINE: ABNORMAL
CALCIUM OXALATE CRYSTALS: ABNORMAL
COLOR: YELLOW
GLUCOSE QUALITATIVE U: NEGATIVE
HYALINE CASTS: 3 /LPF
KETONES URINE: NEGATIVE
LEUKOCYTE ESTERASE URINE: ABNORMAL
MICROSCOPIC-UA: NORMAL
NITRITE URINE: NEGATIVE
PH URINE: 6.5
PROTEIN URINE: ABNORMAL
RED BLOOD CELLS URINE: 5 /HPF
SPECIFIC GRAVITY URINE: 1.03
SQUAMOUS EPITHELIAL CELLS: 11 /HPF
URINE COMMENTS: NORMAL
UROBILINOGEN URINE: ABNORMAL
WHITE BLOOD CELLS URINE: 47 /HPF

## 2023-07-30 ENCOUNTER — NON-APPOINTMENT (OUTPATIENT)
Age: 78
End: 2023-07-30

## 2023-07-31 ENCOUNTER — NON-APPOINTMENT (OUTPATIENT)
Age: 78
End: 2023-07-31

## 2023-07-31 DIAGNOSIS — U07.1 COVID-19: ICD-10-CM

## 2023-08-10 ENCOUNTER — APPOINTMENT (OUTPATIENT)
Dept: INTERNAL MEDICINE | Facility: CLINIC | Age: 78
End: 2023-08-10
Payer: MEDICARE

## 2023-08-10 VITALS
WEIGHT: 120 LBS | OXYGEN SATURATION: 99 % | HEART RATE: 77 BPM | BODY MASS INDEX: 20.49 KG/M2 | HEIGHT: 64 IN | TEMPERATURE: 97.1 F

## 2023-08-10 DIAGNOSIS — J01.10 ACUTE FRONTAL SINUSITIS, UNSPECIFIED: ICD-10-CM

## 2023-08-10 PROCEDURE — 99213 OFFICE O/P EST LOW 20 MIN: CPT | Mod: 25

## 2023-08-13 VITALS — DIASTOLIC BLOOD PRESSURE: 60 MMHG | SYSTOLIC BLOOD PRESSURE: 95 MMHG

## 2023-08-13 PROBLEM — J01.10 ACUTE NON-RECURRENT FRONTAL SINUSITIS: Status: ACTIVE | Noted: 2023-08-13

## 2023-08-13 NOTE — HISTORY OF PRESENT ILLNESS
[FreeTextEntry8] : The patient had COVID-19 and completed Paxlovid five days ago.  She had been feeling better.  She now feels "removed" for three days, head symptoms, "not right", fatigue.  She has a headache, post nasal drip, neck swelling, ears popping.

## 2023-08-13 NOTE — ASSESSMENT
[FreeTextEntry1] : She had COVID-19 and was feeling better but she now has sinus pressure, pain over the right maxillary region and congestion.  She likely has a sinusitis.  Will treat with Augmentin.  OTC meds discussed.  Call PRN.  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

## 2023-08-25 ENCOUNTER — OUTPATIENT (OUTPATIENT)
Dept: OUTPATIENT SERVICES | Facility: HOSPITAL | Age: 78
LOS: 1 days | End: 2023-08-25
Payer: MEDICARE

## 2023-08-25 ENCOUNTER — APPOINTMENT (OUTPATIENT)
Dept: MRI IMAGING | Facility: CLINIC | Age: 78
End: 2023-08-25
Payer: MEDICARE

## 2023-08-25 DIAGNOSIS — H90.A22 SENSORINEURAL HEARING LOSS, UNILATERAL, LEFT EAR, WITH RESTRICTED HEARING ON THE CONTRALATERAL SIDE: ICD-10-CM

## 2023-08-25 DIAGNOSIS — Z96.651 PRESENCE OF RIGHT ARTIFICIAL KNEE JOINT: Chronic | ICD-10-CM

## 2023-08-25 DIAGNOSIS — Z00.00 ENCOUNTER FOR GENERAL ADULT MEDICAL EXAMINATION WITHOUT ABNORMAL FINDINGS: ICD-10-CM

## 2023-08-25 PROCEDURE — 70553 MRI BRAIN STEM W/O & W/DYE: CPT | Mod: 26,MH

## 2023-08-25 PROCEDURE — 70553 MRI BRAIN STEM W/O & W/DYE: CPT | Mod: MH

## 2023-09-19 ENCOUNTER — RX RENEWAL (OUTPATIENT)
Age: 78
End: 2023-09-19

## 2023-09-21 ENCOUNTER — APPOINTMENT (OUTPATIENT)
Dept: OBGYN | Facility: CLINIC | Age: 78
End: 2023-09-21
Payer: MEDICARE

## 2023-09-21 VITALS
HEIGHT: 64 IN | SYSTOLIC BLOOD PRESSURE: 110 MMHG | DIASTOLIC BLOOD PRESSURE: 68 MMHG | WEIGHT: 122 LBS | BODY MASS INDEX: 20.83 KG/M2

## 2023-09-21 DIAGNOSIS — N90.89 OTHER SPECIFIED NONINFLAMMATORY DISORDERS OF VULVA AND PERINEUM: ICD-10-CM

## 2023-09-21 DIAGNOSIS — N76.0 ACUTE VAGINITIS: ICD-10-CM

## 2023-09-21 PROCEDURE — 81002 URINALYSIS NONAUTO W/O SCOPE: CPT

## 2023-09-21 PROCEDURE — 99213 OFFICE O/P EST LOW 20 MIN: CPT

## 2023-09-22 LAB
CANDIDA VAG CYTO: NOT DETECTED
G VAGINALIS+PREV SP MTYP VAG QL MICRO: NOT DETECTED
T VAGINALIS VAG QL WET PREP: NOT DETECTED

## 2023-10-13 ENCOUNTER — APPOINTMENT (OUTPATIENT)
Dept: INTERNAL MEDICINE | Facility: CLINIC | Age: 78
End: 2023-10-13
Payer: MEDICARE

## 2023-10-13 VITALS
BODY MASS INDEX: 21.35 KG/M2 | WEIGHT: 122 LBS | OXYGEN SATURATION: 99 % | HEIGHT: 63.5 IN | TEMPERATURE: 97.4 F | HEART RATE: 71 BPM

## 2023-10-13 DIAGNOSIS — Z00.00 ENCOUNTER FOR GENERAL ADULT MEDICAL EXAMINATION W/OUT ABNORMAL FINDINGS: ICD-10-CM

## 2023-10-13 DIAGNOSIS — I25.10 ATHEROSCLEROTIC HEART DISEASE OF NATIVE CORONARY ARTERY W/OUT ANGINA PECTORIS: ICD-10-CM

## 2023-10-13 DIAGNOSIS — M81.0 AGE-RELATED OSTEOPOROSIS W/OUT CURRENT PATHOLOGICAL FRACTURE: ICD-10-CM

## 2023-10-13 DIAGNOSIS — R92.30 INCONCLUSIVE MAMMOGRAM: ICD-10-CM

## 2023-10-13 DIAGNOSIS — R92.2 INCONCLUSIVE MAMMOGRAM: ICD-10-CM

## 2023-10-13 PROCEDURE — 90662 IIV NO PRSV INCREASED AG IM: CPT

## 2023-10-13 PROCEDURE — G0439: CPT

## 2023-10-13 PROCEDURE — 36415 COLL VENOUS BLD VENIPUNCTURE: CPT

## 2023-10-13 PROCEDURE — G0008: CPT

## 2023-10-13 RX ORDER — CLOTRIMAZOLE AND BETAMETHASONE DIPROPIONATE 10; .5 MG/G; MG/G
1-0.05 CREAM TOPICAL TWICE DAILY
Qty: 1 | Refills: 1 | Status: DISCONTINUED | COMMUNITY
Start: 2023-09-21 | End: 2023-10-13

## 2023-10-13 RX ORDER — DOCUSATE SODIUM 100 MG/1
100 CAPSULE, LIQUID FILLED ORAL
Qty: 90 | Refills: 3 | Status: DISCONTINUED | COMMUNITY
Start: 2021-07-30 | End: 2023-10-13

## 2023-10-13 RX ORDER — NIRMATRELVIR AND RITONAVIR 300-100 MG
20 X 150 MG & KIT ORAL
Qty: 1 | Refills: 0 | Status: DISCONTINUED | COMMUNITY
Start: 2023-07-31 | End: 2023-10-13

## 2023-10-13 RX ORDER — RISEDRONATE SODIUM 150 MG/1
150 TABLET, FILM COATED ORAL
Refills: 0 | Status: ACTIVE | COMMUNITY

## 2023-10-13 RX ORDER — AMOXICILLIN AND CLAVULANATE POTASSIUM 875; 125 MG/1; MG/1
875-125 TABLET, COATED ORAL
Qty: 20 | Refills: 0 | Status: DISCONTINUED | COMMUNITY
Start: 2022-03-28 | End: 2023-10-13

## 2023-11-14 LAB
25(OH)D3 SERPL-MCNC: 46 NG/ML
ALBUMIN SERPL ELPH-MCNC: 4.1 G/DL
ALP BLD-CCNC: 111 U/L
ALT SERPL-CCNC: 13 U/L
ANION GAP SERPL CALC-SCNC: 10 MMOL/L
AST SERPL-CCNC: 17 U/L
BILIRUB SERPL-MCNC: 0.3 MG/DL
BUN SERPL-MCNC: 21 MG/DL
CALCIUM SERPL-MCNC: 9.6 MG/DL
CHLORIDE SERPL-SCNC: 106 MMOL/L
CHOLEST SERPL-MCNC: 154 MG/DL
CO2 SERPL-SCNC: 25 MMOL/L
CREAT SERPL-MCNC: 0.64 MG/DL
EGFR: 92 ML/MIN/1.73M2
ESTIMATED AVERAGE GLUCOSE: 103 MG/DL
FERRITIN SERPL-MCNC: 41 NG/ML
GLUCOSE SERPL-MCNC: 92 MG/DL
HBA1C MFR BLD HPLC: 5.2 %
HCT VFR BLD CALC: 36.4 %
HDLC SERPL-MCNC: 64 MG/DL
HGB BLD-MCNC: 11.1 G/DL
IRON SATN MFR SERPL: 34 %
IRON SERPL-MCNC: 99 UG/DL
LDLC SERPL CALC-MCNC: 72 MG/DL
MCHC RBC-ENTMCNC: 27.5 PG
MCHC RBC-ENTMCNC: 30.5 GM/DL
MCV RBC AUTO: 90.1 FL
NONHDLC SERPL-MCNC: 89 MG/DL
PLATELET # BLD AUTO: 265 K/UL
POTASSIUM SERPL-SCNC: 4.3 MMOL/L
PROT SERPL-MCNC: 6.5 G/DL
RBC # BLD: 4.04 M/UL
RBC # FLD: 14 %
SODIUM SERPL-SCNC: 141 MMOL/L
T4 FREE SERPL-MCNC: 1 NG/DL
TIBC SERPL-MCNC: 290 UG/DL
TRIGL SERPL-MCNC: 93 MG/DL
TSH SERPL-ACNC: 1.23 UIU/ML
UIBC SERPL-MCNC: 191 UG/DL
WBC # FLD AUTO: 7.95 K/UL

## 2023-11-22 ENCOUNTER — TRANSCRIPTION ENCOUNTER (OUTPATIENT)
Age: 78
End: 2023-11-22

## 2023-11-30 ENCOUNTER — APPOINTMENT (OUTPATIENT)
Dept: OBGYN | Facility: CLINIC | Age: 78
End: 2023-11-30
Payer: MEDICARE

## 2023-11-30 ENCOUNTER — ASOB RESULT (OUTPATIENT)
Age: 78
End: 2023-11-30

## 2023-11-30 VITALS — SYSTOLIC BLOOD PRESSURE: 105 MMHG | DIASTOLIC BLOOD PRESSURE: 66 MMHG

## 2023-11-30 DIAGNOSIS — R10.2 PELVIC AND PERINEAL PAIN: ICD-10-CM

## 2023-11-30 DIAGNOSIS — D21.9 BENIGN NEOPLASM OF CONNECTIVE AND OTHER SOFT TISSUE, UNSPECIFIED: ICD-10-CM

## 2023-11-30 PROCEDURE — 99214 OFFICE O/P EST MOD 30 MIN: CPT | Mod: 25

## 2023-11-30 PROCEDURE — 81002 URINALYSIS NONAUTO W/O SCOPE: CPT

## 2023-12-04 RX ORDER — NITROFURANTOIN (MONOHYDRATE/MACROCRYSTALS) 25; 75 MG/1; MG/1
100 CAPSULE ORAL
Qty: 10 | Refills: 0 | Status: ACTIVE | COMMUNITY
Start: 2023-12-04 | End: 1900-01-01

## 2023-12-10 ENCOUNTER — NON-APPOINTMENT (OUTPATIENT)
Age: 78
End: 2023-12-10

## 2023-12-11 ENCOUNTER — TRANSCRIPTION ENCOUNTER (OUTPATIENT)
Age: 78
End: 2023-12-11

## 2023-12-11 DIAGNOSIS — R39.9 UNSPECIFIED SYMPTOMS AND SIGNS INVOLVING THE GENITOURINARY SYSTEM: ICD-10-CM

## 2023-12-11 RX ORDER — FOSFOMYCIN TROMETHAMINE 3 G/1
3 POWDER ORAL
Qty: 1 | Refills: 1 | Status: ACTIVE | COMMUNITY
Start: 2023-12-11 | End: 1900-01-01

## 2023-12-15 ENCOUNTER — TRANSCRIPTION ENCOUNTER (OUTPATIENT)
Age: 78
End: 2023-12-15

## 2023-12-15 ENCOUNTER — NON-APPOINTMENT (OUTPATIENT)
Age: 78
End: 2023-12-15

## 2024-01-19 ENCOUNTER — APPOINTMENT (OUTPATIENT)
Dept: INTERNAL MEDICINE | Facility: CLINIC | Age: 79
End: 2024-01-19

## 2024-01-24 ENCOUNTER — TRANSCRIPTION ENCOUNTER (OUTPATIENT)
Age: 79
End: 2024-01-24

## 2024-01-29 ENCOUNTER — TRANSCRIPTION ENCOUNTER (OUTPATIENT)
Age: 79
End: 2024-01-29

## 2024-01-30 ENCOUNTER — APPOINTMENT (OUTPATIENT)
Dept: INTERNAL MEDICINE | Facility: CLINIC | Age: 79
End: 2024-01-30
Payer: MEDICARE

## 2024-01-30 VITALS — DIASTOLIC BLOOD PRESSURE: 65 MMHG | SYSTOLIC BLOOD PRESSURE: 110 MMHG | HEART RATE: 60 BPM

## 2024-01-30 VITALS
OXYGEN SATURATION: 99 % | BODY MASS INDEX: 20.65 KG/M2 | HEART RATE: 65 BPM | HEIGHT: 63.5 IN | WEIGHT: 118 LBS | TEMPERATURE: 97.5 F

## 2024-01-30 DIAGNOSIS — R53.83 OTHER FATIGUE: ICD-10-CM

## 2024-01-30 PROCEDURE — 99214 OFFICE O/P EST MOD 30 MIN: CPT | Mod: 25

## 2024-01-30 PROCEDURE — 36415 COLL VENOUS BLD VENIPUNCTURE: CPT

## 2024-01-30 RX ORDER — GABAPENTIN 100 MG/1
100 CAPSULE ORAL
Qty: 60 | Refills: 5 | Status: ACTIVE | COMMUNITY
Start: 1900-01-01 | End: 1900-01-01

## 2024-01-30 NOTE — HISTORY OF PRESENT ILLNESS
[FreeTextEntry8] : The patient had throat symptoms, laryngitis and swollen neck glands starting two weeks ago.  She saw her ENT 11 days ago and was prescribed Cefdinir which she took for about six days and she took a Medrol Dosepack.  She still has throat symptoms, post nasal drip sense of lump in the throat, headache.  Four days ago she had acute vomiting for 1-2 days which has now resolved.  She felt hot/cold, fatigue, weakness.  No fever, abdominal pain, diarrhea.  She still has nausea.  She is afraid to eat but she is able to eat.

## 2024-02-01 ENCOUNTER — NON-APPOINTMENT (OUTPATIENT)
Age: 79
End: 2024-02-01

## 2024-02-01 ENCOUNTER — TRANSCRIPTION ENCOUNTER (OUTPATIENT)
Age: 79
End: 2024-02-01

## 2024-02-01 DIAGNOSIS — Z12.39 ENCOUNTER FOR OTHER SCREENING FOR MALIGNANT NEOPLASM OF BREAST: ICD-10-CM

## 2024-02-01 LAB
ALBUMIN SERPL ELPH-MCNC: 4.3 G/DL
ALP BLD-CCNC: 106 U/L
ALT SERPL-CCNC: 10 U/L
ANION GAP SERPL CALC-SCNC: 14 MMOL/L
AST SERPL-CCNC: 11 U/L
BILIRUB SERPL-MCNC: 0.3 MG/DL
BUN SERPL-MCNC: 22 MG/DL
CALCIUM SERPL-MCNC: 9.6 MG/DL
CHLORIDE SERPL-SCNC: 102 MMOL/L
CO2 SERPL-SCNC: 25 MMOL/L
CREAT SERPL-MCNC: 0.83 MG/DL
EGFR: 73 ML/MIN/1.73M2
GLUCOSE SERPL-MCNC: 70 MG/DL
HCT VFR BLD CALC: 39.8 %
HGB BLD-MCNC: 12.3 G/DL
MCHC RBC-ENTMCNC: 27.2 PG
MCHC RBC-ENTMCNC: 30.9 GM/DL
MCV RBC AUTO: 87.9 FL
PLATELET # BLD AUTO: 312 K/UL
POTASSIUM SERPL-SCNC: 3.5 MMOL/L
PROT SERPL-MCNC: 6.9 G/DL
RBC # BLD: 4.53 M/UL
RBC # FLD: 14.6 %
SODIUM SERPL-SCNC: 142 MMOL/L
WBC # FLD AUTO: 7.3 K/UL

## 2024-02-05 ENCOUNTER — RX RENEWAL (OUTPATIENT)
Age: 79
End: 2024-02-05

## 2024-02-11 ENCOUNTER — RX RENEWAL (OUTPATIENT)
Age: 79
End: 2024-02-11

## 2024-03-01 NOTE — ED PROVIDER NOTE - NS ED ATTENDING STATEMENT MOD
patient/family
I have personally seen and examined this patient.  I have fully participated in the care of this patient. I have reviewed all pertinent clinical information, including history, physical exam, plan and the Resident’s note and agree except as noted.

## 2024-03-11 ENCOUNTER — APPOINTMENT (OUTPATIENT)
Dept: OBGYN | Facility: CLINIC | Age: 79
End: 2024-03-11

## 2024-05-21 ENCOUNTER — TRANSCRIPTION ENCOUNTER (OUTPATIENT)
Age: 79
End: 2024-05-21

## 2024-05-22 ENCOUNTER — TRANSCRIPTION ENCOUNTER (OUTPATIENT)
Age: 79
End: 2024-05-22

## 2024-06-04 ENCOUNTER — TRANSCRIPTION ENCOUNTER (OUTPATIENT)
Age: 79
End: 2024-06-04

## 2024-06-05 ENCOUNTER — TRANSCRIPTION ENCOUNTER (OUTPATIENT)
Age: 79
End: 2024-06-05

## 2024-07-01 ENCOUNTER — TRANSCRIPTION ENCOUNTER (OUTPATIENT)
Age: 79
End: 2024-07-01

## 2024-07-15 DIAGNOSIS — I25.10 ATHEROSCLEROTIC HEART DISEASE OF NATIVE CORONARY ARTERY W/OUT ANGINA PECTORIS: ICD-10-CM

## 2024-07-15 DIAGNOSIS — Z86.39 PERSONAL HISTORY OF OTHER ENDOCRINE, NUTRITIONAL AND METABOLIC DISEASE: ICD-10-CM

## 2024-07-15 DIAGNOSIS — Z00.00 ENCOUNTER FOR GENERAL ADULT MEDICAL EXAMINATION W/OUT ABNORMAL FINDINGS: ICD-10-CM

## 2024-07-15 DIAGNOSIS — M81.0 AGE-RELATED OSTEOPOROSIS W/OUT CURRENT PATHOLOGICAL FRACTURE: ICD-10-CM

## 2024-07-15 DIAGNOSIS — R53.83 OTHER FATIGUE: ICD-10-CM

## 2024-07-15 DIAGNOSIS — E53.8 DEFICIENCY OF OTHER SPECIFIED B GROUP VITAMINS: ICD-10-CM

## 2024-07-29 ENCOUNTER — RX RENEWAL (OUTPATIENT)
Age: 79
End: 2024-07-29

## 2024-08-28 DIAGNOSIS — Z12.39 ENCOUNTER FOR OTHER SCREENING FOR MALIGNANT NEOPLASM OF BREAST: ICD-10-CM

## 2024-09-20 ENCOUNTER — APPOINTMENT (OUTPATIENT)
Dept: INTERNAL MEDICINE | Facility: CLINIC | Age: 79
End: 2024-09-20
Payer: MEDICARE

## 2024-09-20 VITALS
HEIGHT: 63.5 IN | TEMPERATURE: 99.3 F | OXYGEN SATURATION: 98 % | HEART RATE: 65 BPM | WEIGHT: 120 LBS | BODY MASS INDEX: 21 KG/M2

## 2024-09-20 DIAGNOSIS — I25.10 ATHEROSCLEROTIC HEART DISEASE OF NATIVE CORONARY ARTERY W/OUT ANGINA PECTORIS: ICD-10-CM

## 2024-09-20 DIAGNOSIS — R05.9 COUGH, UNSPECIFIED: ICD-10-CM

## 2024-09-20 DIAGNOSIS — J01.80 OTHER ACUTE SINUSITIS: ICD-10-CM

## 2024-09-20 DIAGNOSIS — R53.83 OTHER FATIGUE: ICD-10-CM

## 2024-09-20 PROCEDURE — G2211 COMPLEX E/M VISIT ADD ON: CPT

## 2024-09-20 PROCEDURE — 36415 COLL VENOUS BLD VENIPUNCTURE: CPT

## 2024-09-20 PROCEDURE — 99214 OFFICE O/P EST MOD 30 MIN: CPT

## 2024-09-20 RX ORDER — DOXYCYCLINE 100 MG/1
100 CAPSULE ORAL TWICE DAILY
Qty: 20 | Refills: 0 | Status: ACTIVE | COMMUNITY
Start: 2024-09-20 | End: 1900-01-01

## 2024-09-21 VITALS — DIASTOLIC BLOOD PRESSURE: 65 MMHG | SYSTOLIC BLOOD PRESSURE: 105 MMHG

## 2024-09-21 NOTE — HISTORY OF PRESENT ILLNESS
[de-identified] : The patient had sinusitis symptoms six weeks ago and she saw her ENT and took a cephalosporin for ten days which helped.  She had fatigue and a post nasal drip.   She saw her cardiologist, Dr. Stoddard and had an echocardiogram and EST that she says were fine. A heart monitor is pending.  COVID-19 testing negative.  She has sinus pain, headache, legs ache, fatigue, lack of energy, sore throat, cough, achiness now.

## 2024-09-21 NOTE — ASSESSMENT
[FreeTextEntry1] : She had a recent sinusitis and she again has URI and sinus symptoms. Will treat with Doxycycline.  Call PRN.  She can see her ENT.  She has ongoing fatigue.  Check blood tests to rule out a new issue.  She has had a cardiology evaluation of her symptoms.    She can use Neosporin for a small shin wound.  She will have the flu vaccine and COVID-19 vaccine next month.    The BP is good.    CT chest fine 8/5/24.

## 2024-09-21 NOTE — HISTORY OF PRESENT ILLNESS
[de-identified] : The patient had sinusitis symptoms six weeks ago and she saw her ENT and took a cephalosporin for ten days which helped.  She had fatigue and a post nasal drip.   She saw her cardiologist, Dr. Stoddard and had an echocardiogram and EST that she says were fine. A heart monitor is pending.  COVID-19 testing negative.  She has sinus pain, headache, legs ache, fatigue, lack of energy, sore throat, cough, achiness now.

## 2024-09-27 ENCOUNTER — TRANSCRIPTION ENCOUNTER (OUTPATIENT)
Age: 79
End: 2024-09-27

## 2024-09-27 ENCOUNTER — NON-APPOINTMENT (OUTPATIENT)
Age: 79
End: 2024-09-27

## 2024-09-27 LAB
ALBUMIN SERPL ELPH-MCNC: 4.1 G/DL
ALP BLD-CCNC: 100 U/L
ALT SERPL-CCNC: 12 U/L
ANION GAP SERPL CALC-SCNC: 13 MMOL/L
AST SERPL-CCNC: 17 U/L
BILIRUB SERPL-MCNC: 0.2 MG/DL
BUN SERPL-MCNC: 20 MG/DL
CALCIUM SERPL-MCNC: 9.4 MG/DL
CHLORIDE SERPL-SCNC: 104 MMOL/L
CHOLEST SERPL-MCNC: 158 MG/DL
CK SERPL-CCNC: 78 U/L
CO2 SERPL-SCNC: 25 MMOL/L
CREAT SERPL-MCNC: 0.63 MG/DL
EGFR: 91 ML/MIN/1.73M2
ESTIMATED AVERAGE GLUCOSE: 105 MG/DL
FERRITIN SERPL-MCNC: 38 NG/ML
GLUCOSE SERPL-MCNC: 84 MG/DL
HBA1C MFR BLD HPLC: 5.3 %
HCT VFR BLD CALC: 34.8 %
HDLC SERPL-MCNC: 68 MG/DL
HGB BLD-MCNC: 10.8 G/DL
IRON SATN MFR SERPL: 32 %
IRON SERPL-MCNC: 77 UG/DL
LDLC SERPL CALC-MCNC: 76 MG/DL
MCHC RBC-ENTMCNC: 27.4 PG
MCHC RBC-ENTMCNC: 31 GM/DL
MCV RBC AUTO: 88.3 FL
NONHDLC SERPL-MCNC: 90 MG/DL
PLATELET # BLD AUTO: 250 K/UL
POTASSIUM SERPL-SCNC: 4 MMOL/L
PROT SERPL-MCNC: 6.5 G/DL
RBC # BLD: 3.94 M/UL
RBC # FLD: 14.6 %
SODIUM SERPL-SCNC: 142 MMOL/L
T4 FREE SERPL-MCNC: 1.1 NG/DL
TIBC SERPL-MCNC: 243 UG/DL
TRIGL SERPL-MCNC: 75 MG/DL
TSH SERPL-ACNC: 1.35 UIU/ML
UIBC SERPL-MCNC: 167 UG/DL
VIT B12 SERPL-MCNC: 313 PG/ML
WBC # FLD AUTO: 5.99 K/UL

## 2024-10-15 ENCOUNTER — APPOINTMENT (OUTPATIENT)
Dept: INTERNAL MEDICINE | Facility: CLINIC | Age: 79
End: 2024-10-15
Payer: MEDICARE

## 2024-10-15 VITALS
WEIGHT: 120 LBS | OXYGEN SATURATION: 97 % | TEMPERATURE: 98 F | HEIGHT: 64 IN | HEART RATE: 80 BPM | BODY MASS INDEX: 20.49 KG/M2

## 2024-10-15 DIAGNOSIS — M81.0 AGE-RELATED OSTEOPOROSIS W/OUT CURRENT PATHOLOGICAL FRACTURE: ICD-10-CM

## 2024-10-15 DIAGNOSIS — R53.83 OTHER FATIGUE: ICD-10-CM

## 2024-10-15 DIAGNOSIS — I25.10 ATHEROSCLEROTIC HEART DISEASE OF NATIVE CORONARY ARTERY W/OUT ANGINA PECTORIS: ICD-10-CM

## 2024-10-15 DIAGNOSIS — Z00.00 ENCOUNTER FOR GENERAL ADULT MEDICAL EXAMINATION W/OUT ABNORMAL FINDINGS: ICD-10-CM

## 2024-10-15 DIAGNOSIS — Z86.2 PERSONAL HISTORY OF DISEASES OF THE BLOOD AND BLOOD-FORMING ORGANS AND CERTAIN DISORDERS INVOLVING THE IMMUNE MECHANISM: ICD-10-CM

## 2024-10-15 PROCEDURE — G0439: CPT

## 2024-10-15 PROCEDURE — G0008: CPT

## 2024-10-15 PROCEDURE — 90662 IIV NO PRSV INCREASED AG IM: CPT

## 2024-10-15 PROCEDURE — 96372 THER/PROPH/DIAG INJ SC/IM: CPT

## 2024-10-15 PROCEDURE — 36415 COLL VENOUS BLD VENIPUNCTURE: CPT

## 2024-10-15 RX ADMIN — CYANOCOBALAMIN 1 MCG/ML: 1000 INJECTION INTRAMUSCULAR; SUBCUTANEOUS at 00:00

## 2024-10-16 LAB
APPEARANCE: CLEAR
BACTERIA: NEGATIVE /HPF
BILIRUBIN URINE: NEGATIVE
BLOOD URINE: NEGATIVE
CALCIUM OXALATE CRYSTALS: PRESENT
CAST: NORMAL /LPF
COLOR: YELLOW
EPITHELIAL CELLS: 6 /HPF
GLUCOSE QUALITATIVE U: NEGATIVE MG/DL
HCT VFR BLD CALC: 34 %
HGB BLD-MCNC: 10.7 G/DL
KETONES URINE: NEGATIVE MG/DL
LEUKOCYTE ESTERASE URINE: ABNORMAL
MCHC RBC-ENTMCNC: 27.2 PG
MCHC RBC-ENTMCNC: 31.5 GM/DL
MCV RBC AUTO: 86.3 FL
MICROSCOPIC-UA: NORMAL
NITRITE URINE: NEGATIVE
PH URINE: 6
PLATELET # BLD AUTO: 309 K/UL
PROTEIN URINE: NEGATIVE MG/DL
RBC # BLD: 3.94 M/UL
RBC # FLD: 14.4 %
RED BLOOD CELLS URINE: NORMAL /HPF
REVIEW: NORMAL
SPECIFIC GRAVITY URINE: 1.02
UROBILINOGEN URINE: 1 MG/DL
VIT B12 SERPL-MCNC: 407 PG/ML
WBC # FLD AUTO: 7.41 K/UL
WHITE BLOOD CELLS URINE: 11 /HPF

## 2024-11-26 ENCOUNTER — TRANSCRIPTION ENCOUNTER (OUTPATIENT)
Age: 79
End: 2024-11-26

## 2024-11-27 ENCOUNTER — TRANSCRIPTION ENCOUNTER (OUTPATIENT)
Age: 79
End: 2024-11-27

## 2024-12-05 ENCOUNTER — APPOINTMENT (OUTPATIENT)
Dept: INTERNAL MEDICINE | Facility: CLINIC | Age: 79
End: 2024-12-05
Payer: MEDICARE

## 2024-12-05 VITALS
WEIGHT: 121 LBS | BODY MASS INDEX: 20.66 KG/M2 | OXYGEN SATURATION: 98 % | HEIGHT: 64 IN | HEART RATE: 70 BPM | TEMPERATURE: 97.7 F

## 2024-12-05 DIAGNOSIS — J01.10 ACUTE FRONTAL SINUSITIS, UNSPECIFIED: ICD-10-CM

## 2024-12-05 PROCEDURE — 99213 OFFICE O/P EST LOW 20 MIN: CPT

## 2024-12-05 RX ORDER — AMOXICILLIN AND CLAVULANATE POTASSIUM 875; 125 MG/1; MG/1
875-125 TABLET, COATED ORAL
Qty: 20 | Refills: 0 | Status: ACTIVE | COMMUNITY
Start: 2024-12-05 | End: 1900-01-01

## 2024-12-06 ENCOUNTER — TRANSCRIPTION ENCOUNTER (OUTPATIENT)
Age: 79
End: 2024-12-06

## 2025-01-09 ENCOUNTER — APPOINTMENT (OUTPATIENT)
Dept: INTERNAL MEDICINE | Facility: CLINIC | Age: 80
End: 2025-01-09

## 2025-01-09 ENCOUNTER — NON-APPOINTMENT (OUTPATIENT)
Age: 80
End: 2025-01-09

## 2025-01-09 VITALS
HEIGHT: 64 IN | TEMPERATURE: 97.5 F | BODY MASS INDEX: 20.66 KG/M2 | HEART RATE: 72 BPM | OXYGEN SATURATION: 99 % | WEIGHT: 121 LBS

## 2025-01-09 DIAGNOSIS — Z11.59 ENCOUNTER FOR SCREENING FOR OTHER VIRAL DISEASES: ICD-10-CM

## 2025-01-09 DIAGNOSIS — U07.1 COVID-19: ICD-10-CM

## 2025-01-09 DIAGNOSIS — R39.9 UNSPECIFIED SYMPTOMS AND SIGNS INVOLVING THE GENITOURINARY SYSTEM: ICD-10-CM

## 2025-01-09 DIAGNOSIS — Z01.818 ENCOUNTER FOR OTHER PREPROCEDURAL EXAMINATION: ICD-10-CM

## 2025-01-09 PROCEDURE — 99214 OFFICE O/P EST MOD 30 MIN: CPT | Mod: 25

## 2025-01-09 PROCEDURE — 96372 THER/PROPH/DIAG INJ SC/IM: CPT

## 2025-01-09 PROCEDURE — 93000 ELECTROCARDIOGRAM COMPLETE: CPT

## 2025-01-14 ENCOUNTER — RESULT CHARGE (OUTPATIENT)
Age: 80
End: 2025-01-14

## 2025-01-14 VITALS — DIASTOLIC BLOOD PRESSURE: 65 MMHG | SYSTOLIC BLOOD PRESSURE: 110 MMHG

## 2025-01-14 PROBLEM — Z01.818 PREOPERATIVE EXAMINATION: Status: ACTIVE | Noted: 2020-07-23

## 2025-01-30 ENCOUNTER — RX RENEWAL (OUTPATIENT)
Age: 80
End: 2025-01-30

## 2025-02-04 ENCOUNTER — RX RENEWAL (OUTPATIENT)
Age: 80
End: 2025-02-04

## 2025-02-06 ENCOUNTER — APPOINTMENT (OUTPATIENT)
Dept: OBGYN | Facility: CLINIC | Age: 80
End: 2025-02-06
Payer: MEDICARE

## 2025-02-06 VITALS
DIASTOLIC BLOOD PRESSURE: 73 MMHG | HEIGHT: 64 IN | BODY MASS INDEX: 21 KG/M2 | WEIGHT: 123 LBS | SYSTOLIC BLOOD PRESSURE: 119 MMHG

## 2025-02-06 DIAGNOSIS — N90.5 ATROPHY OF VULVA: ICD-10-CM

## 2025-02-06 DIAGNOSIS — Z01.411 ENCOUNTER FOR GYNECOLOGICAL EXAMINATION (GENERAL) (ROUTINE) WITH ABNORMAL FINDINGS: ICD-10-CM

## 2025-02-06 DIAGNOSIS — D21.9 BENIGN NEOPLASM OF CONNECTIVE AND OTHER SOFT TISSUE, UNSPECIFIED: ICD-10-CM

## 2025-02-06 DIAGNOSIS — M81.0 AGE-RELATED OSTEOPOROSIS W/OUT CURRENT PATHOLOGICAL FRACTURE: ICD-10-CM

## 2025-02-06 DIAGNOSIS — Z13.31 ENCOUNTER FOR SCREENING FOR DEPRESSION: ICD-10-CM

## 2025-02-06 DIAGNOSIS — R92.30 DENSE BREASTS, UNSPECIFIED: ICD-10-CM

## 2025-02-06 DIAGNOSIS — N81.4 UTEROVAGINAL PROLAPSE, UNSPECIFIED: ICD-10-CM

## 2025-02-06 PROCEDURE — G0328 FECAL BLOOD SCRN IMMUNOASSAY: CPT | Mod: QW

## 2025-02-06 PROCEDURE — G0101: CPT

## 2025-02-07 ENCOUNTER — NON-APPOINTMENT (OUTPATIENT)
Age: 80
End: 2025-02-07

## 2025-02-11 LAB
CYTOLOGY CVX/VAG DOC THIN PREP: NORMAL
HPV HIGH+LOW RISK DNA PNL CVX: NOT DETECTED

## 2025-03-04 ENCOUNTER — TRANSCRIPTION ENCOUNTER (OUTPATIENT)
Age: 80
End: 2025-03-04

## 2025-03-28 ENCOUNTER — APPOINTMENT (OUTPATIENT)
Dept: INTERNAL MEDICINE | Facility: CLINIC | Age: 80
End: 2025-03-28

## 2025-03-28 VITALS
OXYGEN SATURATION: 99 % | HEIGHT: 64 IN | TEMPERATURE: 98.3 F | HEART RATE: 82 BPM | BODY MASS INDEX: 20.32 KG/M2 | WEIGHT: 119 LBS

## 2025-03-28 DIAGNOSIS — R53.83 OTHER FATIGUE: ICD-10-CM

## 2025-03-28 PROCEDURE — 36415 COLL VENOUS BLD VENIPUNCTURE: CPT

## 2025-03-28 PROCEDURE — 99213 OFFICE O/P EST LOW 20 MIN: CPT

## 2025-03-28 PROCEDURE — G2211 COMPLEX E/M VISIT ADD ON: CPT

## 2025-03-28 RX ORDER — METHYLPREDNISOLONE 4 MG/1
4 TABLET ORAL
Qty: 1 | Refills: 1 | Status: ACTIVE | COMMUNITY
Start: 2025-03-28 | End: 1900-01-01

## 2025-04-01 ENCOUNTER — NON-APPOINTMENT (OUTPATIENT)
Age: 80
End: 2025-04-01

## 2025-04-01 LAB
ALBUMIN SERPL ELPH-MCNC: 4.2 G/DL
ALP BLD-CCNC: 112 U/L
ALT SERPL-CCNC: 8 U/L
ANION GAP SERPL CALC-SCNC: 15 MMOL/L
AST SERPL-CCNC: 13 U/L
BASOPHILS # BLD AUTO: 0.05 K/UL
BASOPHILS NFR BLD AUTO: 0.6 %
BILIRUB SERPL-MCNC: 0.2 MG/DL
BUN SERPL-MCNC: 19 MG/DL
CALCIUM SERPL-MCNC: 9.6 MG/DL
CHLORIDE SERPL-SCNC: 104 MMOL/L
CO2 SERPL-SCNC: 22 MMOL/L
CREAT SERPL-MCNC: 0.69 MG/DL
EGFRCR SERPLBLD CKD-EPI 2021: 89 ML/MIN/1.73M2
EOSINOPHIL # BLD AUTO: 0.11 K/UL
EOSINOPHIL NFR BLD AUTO: 1.4 %
FERRITIN SERPL-MCNC: 39 NG/ML
GLUCOSE SERPL-MCNC: 112 MG/DL
HCT VFR BLD CALC: 39 %
HGB BLD-MCNC: 12.1 G/DL
IMM GRANULOCYTES NFR BLD AUTO: 0.4 %
IRON SATN MFR SERPL: 27 %
IRON SERPL-MCNC: 75 UG/DL
LYMPHOCYTES # BLD AUTO: 1.58 K/UL
LYMPHOCYTES NFR BLD AUTO: 20.2 %
MAN DIFF?: NORMAL
MCHC RBC-ENTMCNC: 27.6 PG
MCHC RBC-ENTMCNC: 31 G/DL
MCV RBC AUTO: 88.8 FL
MONOCYTES # BLD AUTO: 0.49 K/UL
MONOCYTES NFR BLD AUTO: 6.3 %
NEUTROPHILS # BLD AUTO: 5.58 K/UL
NEUTROPHILS NFR BLD AUTO: 71.1 %
PLATELET # BLD AUTO: 326 K/UL
POTASSIUM SERPL-SCNC: 3.9 MMOL/L
PROT SERPL-MCNC: 6.3 G/DL
RBC # BLD: 4.39 M/UL
RBC # FLD: 14.6 %
SODIUM SERPL-SCNC: 141 MMOL/L
TIBC SERPL-MCNC: 281 UG/DL
UIBC SERPL-MCNC: 206 UG/DL
VIT B12 SERPL-MCNC: 418 PG/ML
WBC # FLD AUTO: 7.84 K/UL

## 2025-04-02 ENCOUNTER — TRANSCRIPTION ENCOUNTER (OUTPATIENT)
Age: 80
End: 2025-04-02

## 2025-04-02 ENCOUNTER — APPOINTMENT (OUTPATIENT)
Dept: OBGYN | Facility: CLINIC | Age: 80
End: 2025-04-02
Payer: MEDICARE

## 2025-04-02 ENCOUNTER — ASOB RESULT (OUTPATIENT)
Age: 80
End: 2025-04-02

## 2025-04-02 PROCEDURE — 76830 TRANSVAGINAL US NON-OB: CPT

## 2025-04-11 ENCOUNTER — TRANSCRIPTION ENCOUNTER (OUTPATIENT)
Age: 80
End: 2025-04-11

## 2025-06-03 ENCOUNTER — NON-APPOINTMENT (OUTPATIENT)
Age: 80
End: 2025-06-03

## 2025-06-03 DIAGNOSIS — R19.00 INTRA-ABDOMINAL AND PELVIC SWELLING, MASS AND LUMP, UNSPECIFIED SITE: ICD-10-CM

## 2025-06-04 ENCOUNTER — APPOINTMENT (OUTPATIENT)
Dept: MRI IMAGING | Facility: CLINIC | Age: 80
End: 2025-06-04
Payer: MEDICARE

## 2025-06-04 PROCEDURE — 72197 MRI PELVIS W/O & W/DYE: CPT | Mod: TC

## 2025-06-04 PROCEDURE — A9585: CPT

## 2025-06-10 ENCOUNTER — NON-APPOINTMENT (OUTPATIENT)
Age: 80
End: 2025-06-10

## 2025-06-11 PROBLEM — R93.89 THICKENED ENDOMETRIUM: Status: ACTIVE | Noted: 2025-06-11

## 2025-06-12 ENCOUNTER — RESULT REVIEW (OUTPATIENT)
Age: 80
End: 2025-06-12

## 2025-06-12 ENCOUNTER — NON-APPOINTMENT (OUTPATIENT)
Age: 80
End: 2025-06-12

## 2025-06-12 ENCOUNTER — OUTPATIENT (OUTPATIENT)
Dept: OUTPATIENT SERVICES | Facility: HOSPITAL | Age: 80
LOS: 1 days | End: 2025-06-12
Payer: MEDICARE

## 2025-06-12 DIAGNOSIS — R93.89 ABNORMAL FINDINGS ON DIAGNOSTIC IMAGING OF OTHER SPECIFIED BODY STRUCTURES: ICD-10-CM

## 2025-06-12 DIAGNOSIS — Z96.651 PRESENCE OF RIGHT ARTIFICIAL KNEE JOINT: Chronic | ICD-10-CM

## 2025-06-12 PROCEDURE — 76856 US EXAM PELVIC COMPLETE: CPT

## 2025-06-12 PROCEDURE — 76377 3D RENDER W/INTRP POSTPROCES: CPT

## 2025-06-12 PROCEDURE — 76856 US EXAM PELVIC COMPLETE: CPT | Mod: 26

## 2025-06-12 PROCEDURE — 76830 TRANSVAGINAL US NON-OB: CPT

## 2025-06-12 PROCEDURE — 76830 TRANSVAGINAL US NON-OB: CPT | Mod: 26

## 2025-06-12 PROCEDURE — 76377 3D RENDER W/INTRP POSTPROCES: CPT | Mod: 26

## 2025-06-16 ENCOUNTER — TRANSCRIPTION ENCOUNTER (OUTPATIENT)
Age: 80
End: 2025-06-16

## 2025-07-14 ENCOUNTER — TRANSCRIPTION ENCOUNTER (OUTPATIENT)
Age: 80
End: 2025-07-14

## 2025-07-14 ENCOUNTER — NON-APPOINTMENT (OUTPATIENT)
Age: 80
End: 2025-07-14

## 2025-07-16 ENCOUNTER — TRANSCRIPTION ENCOUNTER (OUTPATIENT)
Age: 80
End: 2025-07-16

## 2025-07-22 ENCOUNTER — TRANSCRIPTION ENCOUNTER (OUTPATIENT)
Age: 80
End: 2025-07-22

## 2025-08-11 ENCOUNTER — APPOINTMENT (OUTPATIENT)
Dept: INTERNAL MEDICINE | Facility: CLINIC | Age: 80
End: 2025-08-11
Payer: MEDICARE

## 2025-08-11 VITALS
DIASTOLIC BLOOD PRESSURE: 60 MMHG | SYSTOLIC BLOOD PRESSURE: 90 MMHG | TEMPERATURE: 98.6 F | RESPIRATION RATE: 14 BRPM | HEART RATE: 110 BPM

## 2025-08-11 VITALS
RESPIRATION RATE: 15 BRPM | BODY MASS INDEX: 21.14 KG/M2 | WEIGHT: 119.31 LBS | HEART RATE: 70 BPM | HEIGHT: 63 IN | TEMPERATURE: 99.1 F | OXYGEN SATURATION: 97 %

## 2025-08-11 DIAGNOSIS — R50.9 FEVER, UNSPECIFIED: ICD-10-CM

## 2025-08-11 PROCEDURE — 36415 COLL VENOUS BLD VENIPUNCTURE: CPT

## 2025-08-11 PROCEDURE — 99214 OFFICE O/P EST MOD 30 MIN: CPT

## 2025-08-11 PROCEDURE — G2211 COMPLEX E/M VISIT ADD ON: CPT

## 2025-08-11 RX ORDER — PANTOPRAZOLE 40 MG/1
40 TABLET, DELAYED RELEASE ORAL
Refills: 0 | Status: ACTIVE | COMMUNITY

## 2025-08-11 RX ORDER — METOPROLOL SUCCINATE 25 MG/1
25 TABLET, EXTENDED RELEASE ORAL
Refills: 0 | Status: ACTIVE | COMMUNITY

## 2025-08-12 LAB
ALBUMIN SERPL ELPH-MCNC: 3.5 G/DL
ALP BLD-CCNC: 139 U/L
ALT SERPL-CCNC: 14 U/L
ANION GAP SERPL CALC-SCNC: 17 MMOL/L
AST SERPL-CCNC: 17 U/L
BASOPHILS # BLD AUTO: 0.02 K/UL
BASOPHILS NFR BLD AUTO: 0.2 %
BILIRUB SERPL-MCNC: 0.3 MG/DL
BUN SERPL-MCNC: 13 MG/DL
CALCIUM SERPL-MCNC: 9.4 MG/DL
CHLORIDE SERPL-SCNC: 99 MMOL/L
CO2 SERPL-SCNC: 21 MMOL/L
CREAT SERPL-MCNC: 0.72 MG/DL
EGFRCR SERPLBLD CKD-EPI 2021: 86 ML/MIN/1.73M2
EOSINOPHIL # BLD AUTO: 0.18 K/UL
EOSINOPHIL NFR BLD AUTO: 1.5 %
GLUCOSE SERPL-MCNC: 138 MG/DL
HCT VFR BLD CALC: 37.9 %
HGB BLD-MCNC: 11.8 G/DL
IMM GRANULOCYTES NFR BLD AUTO: 0.4 %
LYMPHOCYTES # BLD AUTO: 1.29 K/UL
LYMPHOCYTES NFR BLD AUTO: 11 %
MAN DIFF?: NORMAL
MCHC RBC-ENTMCNC: 28.6 PG
MCHC RBC-ENTMCNC: 31.1 G/DL
MCV RBC AUTO: 91.8 FL
MONOCYTES # BLD AUTO: 0.23 K/UL
MONOCYTES NFR BLD AUTO: 2 %
NEUTROPHILS # BLD AUTO: 9.96 K/UL
NEUTROPHILS NFR BLD AUTO: 84.9 %
PLATELET # BLD AUTO: 442 K/UL
POTASSIUM SERPL-SCNC: 3.8 MMOL/L
PROT SERPL-MCNC: 6.8 G/DL
RBC # BLD: 4.13 M/UL
RBC # FLD: 14.1 %
SODIUM SERPL-SCNC: 137 MMOL/L
WBC # FLD AUTO: 11.73 K/UL

## 2025-08-13 ENCOUNTER — TRANSCRIPTION ENCOUNTER (OUTPATIENT)
Age: 80
End: 2025-08-13

## 2025-08-25 ENCOUNTER — NON-APPOINTMENT (OUTPATIENT)
Age: 80
End: 2025-08-25

## 2025-08-26 ENCOUNTER — TRANSCRIPTION ENCOUNTER (OUTPATIENT)
Age: 80
End: 2025-08-26

## 2025-09-05 ENCOUNTER — APPOINTMENT (OUTPATIENT)
Dept: INTERNAL MEDICINE | Facility: CLINIC | Age: 80
End: 2025-09-05
Payer: MEDICARE

## 2025-09-05 VITALS
HEART RATE: 67 BPM | OXYGEN SATURATION: 98 % | WEIGHT: 117 LBS | BODY MASS INDEX: 20.73 KG/M2 | TEMPERATURE: 97.6 F | HEIGHT: 63 IN

## 2025-09-05 DIAGNOSIS — I25.10 ATHEROSCLEROTIC HEART DISEASE OF NATIVE CORONARY ARTERY W/OUT ANGINA PECTORIS: ICD-10-CM

## 2025-09-05 PROCEDURE — 99496 TRANSJ CARE MGMT HIGH F2F 7D: CPT

## 2025-09-05 PROCEDURE — 36415 COLL VENOUS BLD VENIPUNCTURE: CPT

## 2025-09-06 VITALS — SYSTOLIC BLOOD PRESSURE: 95 MMHG | DIASTOLIC BLOOD PRESSURE: 62 MMHG

## 2025-09-06 LAB
ALBUMIN SERPL ELPH-MCNC: 3.6 G/DL
ALP BLD-CCNC: 119 U/L
ALT SERPL-CCNC: 20 U/L
ANION GAP SERPL CALC-SCNC: 13 MMOL/L
AST SERPL-CCNC: 20 U/L
BASOPHILS # BLD AUTO: 0.06 K/UL
BASOPHILS NFR BLD AUTO: 0.8 %
BILIRUB SERPL-MCNC: <0.2 MG/DL
BUN SERPL-MCNC: 18 MG/DL
CALCIUM SERPL-MCNC: 9.6 MG/DL
CHLORIDE SERPL-SCNC: 103 MMOL/L
CO2 SERPL-SCNC: 20 MMOL/L
CREAT SERPL-MCNC: 0.7 MG/DL
EGFRCR SERPLBLD CKD-EPI 2021: 88 ML/MIN/1.73M2
EOSINOPHIL # BLD AUTO: 0.52 K/UL
EOSINOPHIL NFR BLD AUTO: 6.8 %
GLUCOSE SERPL-MCNC: 99 MG/DL
HCT VFR BLD CALC: 33.8 %
HGB BLD-MCNC: 10.6 G/DL
IMM GRANULOCYTES NFR BLD AUTO: 0.4 %
LYMPHOCYTES # BLD AUTO: 1.5 K/UL
LYMPHOCYTES NFR BLD AUTO: 19.6 %
MAN DIFF?: NORMAL
MCHC RBC-ENTMCNC: 27.4 PG
MCHC RBC-ENTMCNC: 31.4 G/DL
MCV RBC AUTO: 87.3 FL
MONOCYTES # BLD AUTO: 0.52 K/UL
MONOCYTES NFR BLD AUTO: 6.8 %
NEUTROPHILS # BLD AUTO: 5.02 K/UL
NEUTROPHILS NFR BLD AUTO: 65.6 %
PLATELET # BLD AUTO: 475 K/UL
POTASSIUM SERPL-SCNC: 4.3 MMOL/L
PROT SERPL-MCNC: 6.4 G/DL
RBC # BLD: 3.87 M/UL
RBC # FLD: 15.7 %
SODIUM SERPL-SCNC: 137 MMOL/L
WBC # FLD AUTO: 7.65 K/UL

## 2025-09-08 ENCOUNTER — TRANSCRIPTION ENCOUNTER (OUTPATIENT)
Age: 80
End: 2025-09-08

## 2025-09-09 ENCOUNTER — APPOINTMENT (OUTPATIENT)
Dept: INTERNAL MEDICINE | Facility: CLINIC | Age: 80
End: 2025-09-09
Payer: MEDICARE

## 2025-09-09 VITALS
HEIGHT: 63 IN | HEART RATE: 86 BPM | TEMPERATURE: 98.3 F | BODY MASS INDEX: 20.55 KG/M2 | WEIGHT: 116 LBS | OXYGEN SATURATION: 97 %

## 2025-09-09 DIAGNOSIS — R50.9 FEVER, UNSPECIFIED: ICD-10-CM

## 2025-09-09 PROCEDURE — 36415 COLL VENOUS BLD VENIPUNCTURE: CPT

## 2025-09-09 PROCEDURE — 99214 OFFICE O/P EST MOD 30 MIN: CPT

## 2025-09-09 PROCEDURE — G2211 COMPLEX E/M VISIT ADD ON: CPT

## 2025-09-10 ENCOUNTER — NON-APPOINTMENT (OUTPATIENT)
Age: 80
End: 2025-09-10

## 2025-09-10 ENCOUNTER — APPOINTMENT (OUTPATIENT)
Dept: RADIOLOGY | Facility: CLINIC | Age: 80
End: 2025-09-10
Payer: MEDICARE

## 2025-09-10 VITALS — HEART RATE: 84 BPM | TEMPERATURE: 98.9 F | DIASTOLIC BLOOD PRESSURE: 60 MMHG | SYSTOLIC BLOOD PRESSURE: 100 MMHG

## 2025-09-10 PROCEDURE — 71046 X-RAY EXAM CHEST 2 VIEWS: CPT

## 2025-09-11 LAB
ALBUMIN SERPL ELPH-MCNC: 3.6 G/DL
ALP BLD-CCNC: 126 U/L
ALT SERPL-CCNC: 15 U/L
ANION GAP SERPL CALC-SCNC: 13 MMOL/L
AST SERPL-CCNC: 15 U/L
BASOPHILS # BLD AUTO: 0.06 K/UL
BASOPHILS NFR BLD AUTO: 0.5 %
BILIRUB SERPL-MCNC: 0.3 MG/DL
BUN SERPL-MCNC: 13 MG/DL
CALCIUM SERPL-MCNC: 9.3 MG/DL
CHLORIDE SERPL-SCNC: 97 MMOL/L
CO2 SERPL-SCNC: 23 MMOL/L
CREAT SERPL-MCNC: 0.71 MG/DL
EGFRCR SERPLBLD CKD-EPI 2021: 87 ML/MIN/1.73M2
EOSINOPHIL # BLD AUTO: 0.16 K/UL
EOSINOPHIL NFR BLD AUTO: 1.2 %
GLUCOSE SERPL-MCNC: 132 MG/DL
HCT VFR BLD CALC: 31.4 %
HGB BLD-MCNC: 9.8 G/DL
IMM GRANULOCYTES NFR BLD AUTO: 0.5 %
INFLUENZA A RESULT: NOT DETECTED
INFLUENZA B RESULT: NOT DETECTED
LYMPHOCYTES # BLD AUTO: 1.16 K/UL
LYMPHOCYTES NFR BLD AUTO: 8.9 %
MAN DIFF?: NORMAL
MCHC RBC-ENTMCNC: 28.1 PG
MCHC RBC-ENTMCNC: 31.2 G/DL
MCV RBC AUTO: 90 FL
MONOCYTES # BLD AUTO: 0.94 K/UL
MONOCYTES NFR BLD AUTO: 7.2 %
NEUTROPHILS # BLD AUTO: 10.67 K/UL
NEUTROPHILS NFR BLD AUTO: 81.7 %
PLATELET # BLD AUTO: 358 K/UL
POTASSIUM SERPL-SCNC: 4.3 MMOL/L
PROT SERPL-MCNC: 6.7 G/DL
RBC # BLD: 3.49 M/UL
RBC # FLD: 15.9 %
RESP SYN VIRUS RESULT: NOT DETECTED
SARS-COV-2 RESULT: NOT DETECTED
SODIUM SERPL-SCNC: 133 MMOL/L
TSH SERPL-ACNC: 0.88 UIU/ML
WBC # FLD AUTO: 13.05 K/UL